# Patient Record
Sex: FEMALE | Race: WHITE | NOT HISPANIC OR LATINO | Employment: UNEMPLOYED | URBAN - METROPOLITAN AREA
[De-identification: names, ages, dates, MRNs, and addresses within clinical notes are randomized per-mention and may not be internally consistent; named-entity substitution may affect disease eponyms.]

---

## 2019-03-13 ENCOUNTER — APPOINTMENT (OUTPATIENT)
Dept: CT IMAGING | Facility: HOSPITAL | Age: 34
End: 2019-03-13

## 2019-03-13 ENCOUNTER — HOSPITAL ENCOUNTER (EMERGENCY)
Facility: HOSPITAL | Age: 34
Discharge: HOME | End: 2019-03-13
Attending: EMERGENCY MEDICINE | Admitting: EMERGENCY MEDICINE

## 2019-03-13 VITALS
HEART RATE: 89 BPM | TEMPERATURE: 98.8 F | OXYGEN SATURATION: 99 % | HEIGHT: 66 IN | SYSTOLIC BLOOD PRESSURE: 121 MMHG | RESPIRATION RATE: 14 BRPM | DIASTOLIC BLOOD PRESSURE: 87 MMHG | BODY MASS INDEX: 33.89 KG/M2 | WEIGHT: 210.9 LBS

## 2019-03-13 DIAGNOSIS — N20.0 KIDNEY STONE: Primary | ICD-10-CM

## 2019-03-13 LAB
ALBUMIN SERPL-MCNC: 4.2 G/DL (ref 3.5–5.2)
ALBUMIN/GLOB SERPL: 1.5 G/DL
ALP SERPL-CCNC: 85 U/L (ref 39–117)
ALT SERPL W P-5'-P-CCNC: 21 U/L (ref 1–33)
ANION GAP SERPL CALCULATED.3IONS-SCNC: 12.1 MMOL/L
AST SERPL-CCNC: 16 U/L (ref 1–32)
BACTERIA UR QL AUTO: ABNORMAL /HPF
BASOPHILS # BLD AUTO: 0.03 10*3/MM3 (ref 0–0.2)
BASOPHILS NFR BLD AUTO: 0.3 % (ref 0–1.5)
BILIRUB SERPL-MCNC: 0.8 MG/DL (ref 0.1–1.2)
BILIRUB UR QL STRIP: NEGATIVE
BUN BLD-MCNC: 16 MG/DL (ref 6–20)
BUN/CREAT SERPL: 25.4 (ref 7–25)
CALCIUM SPEC-SCNC: 9 MG/DL (ref 8.6–10.5)
CHLORIDE SERPL-SCNC: 106 MMOL/L (ref 98–107)
CLARITY UR: ABNORMAL
CO2 SERPL-SCNC: 22.9 MMOL/L (ref 22–29)
COLOR UR: YELLOW
CREAT BLD-MCNC: 0.63 MG/DL (ref 0.57–1)
DEPRECATED RDW RBC AUTO: 42.8 FL (ref 37–54)
EOSINOPHIL # BLD AUTO: 0.24 10*3/MM3 (ref 0–0.4)
EOSINOPHIL NFR BLD AUTO: 2.6 % (ref 0.3–6.2)
ERYTHROCYTE [DISTWIDTH] IN BLOOD BY AUTOMATED COUNT: 13 % (ref 12.3–15.4)
GFR SERPL CREATININE-BSD FRML MDRD: 108 ML/MIN/1.73
GLOBULIN UR ELPH-MCNC: 2.8 GM/DL
GLUCOSE BLD-MCNC: 122 MG/DL (ref 65–99)
GLUCOSE UR STRIP-MCNC: NEGATIVE MG/DL
HCG SERPL QL: NEGATIVE
HCT VFR BLD AUTO: 46 % (ref 34–46.6)
HGB BLD-MCNC: 15 G/DL (ref 12–15.9)
HGB UR QL STRIP.AUTO: ABNORMAL
HYALINE CASTS UR QL AUTO: ABNORMAL /LPF
IMM GRANULOCYTES # BLD AUTO: 0.03 10*3/MM3 (ref 0–0.05)
IMM GRANULOCYTES NFR BLD AUTO: 0.3 % (ref 0–0.5)
KETONES UR QL STRIP: NEGATIVE
LEUKOCYTE ESTERASE UR QL STRIP.AUTO: NEGATIVE
LYMPHOCYTES # BLD AUTO: 1.11 10*3/MM3 (ref 0.7–3.1)
LYMPHOCYTES NFR BLD AUTO: 12.2 % (ref 19.6–45.3)
MCH RBC QN AUTO: 29.8 PG (ref 26.6–33)
MCHC RBC AUTO-ENTMCNC: 32.6 G/DL (ref 31.5–35.7)
MCV RBC AUTO: 91.3 FL (ref 79–97)
MONOCYTES # BLD AUTO: 0.6 10*3/MM3 (ref 0.1–0.9)
MONOCYTES NFR BLD AUTO: 6.6 % (ref 5–12)
NEUTROPHILS # BLD AUTO: 7.11 10*3/MM3 (ref 1.4–7)
NEUTROPHILS NFR BLD AUTO: 78 % (ref 42.7–76)
NITRITE UR QL STRIP: NEGATIVE
NRBC BLD AUTO-RTO: 0 /100 WBC (ref 0–0)
PH UR STRIP.AUTO: 5.5 [PH] (ref 5–8)
PLATELET # BLD AUTO: 350 10*3/MM3 (ref 140–450)
PMV BLD AUTO: 10.1 FL (ref 6–12)
POTASSIUM BLD-SCNC: 4.5 MMOL/L (ref 3.5–5.2)
PROT SERPL-MCNC: 7 G/DL (ref 6–8.5)
PROT UR QL STRIP: NEGATIVE
RBC # BLD AUTO: 5.04 10*6/MM3 (ref 3.77–5.28)
RBC # UR: ABNORMAL /HPF
REF LAB TEST METHOD: ABNORMAL
SODIUM BLD-SCNC: 141 MMOL/L (ref 136–145)
SP GR UR STRIP: 1.02 (ref 1–1.03)
SQUAMOUS #/AREA URNS HPF: ABNORMAL /HPF
UROBILINOGEN UR QL STRIP: ABNORMAL
WBC NRBC COR # BLD: 9.12 10*3/MM3 (ref 3.4–10.8)
WBC UR QL AUTO: ABNORMAL /HPF

## 2019-03-13 PROCEDURE — 96374 THER/PROPH/DIAG INJ IV PUSH: CPT

## 2019-03-13 PROCEDURE — 25010000002 KETOROLAC TROMETHAMINE PER 15 MG: Performed by: NURSE PRACTITIONER

## 2019-03-13 PROCEDURE — 99284 EMERGENCY DEPT VISIT MOD MDM: CPT

## 2019-03-13 PROCEDURE — 81001 URINALYSIS AUTO W/SCOPE: CPT | Performed by: PHYSICIAN ASSISTANT

## 2019-03-13 PROCEDURE — 85025 COMPLETE CBC W/AUTO DIFF WBC: CPT | Performed by: PHYSICIAN ASSISTANT

## 2019-03-13 PROCEDURE — 96375 TX/PRO/DX INJ NEW DRUG ADDON: CPT

## 2019-03-13 PROCEDURE — 25010000002 ONDANSETRON PER 1 MG: Performed by: NURSE PRACTITIONER

## 2019-03-13 PROCEDURE — 25010000002 ONDANSETRON PER 1 MG: Performed by: EMERGENCY MEDICINE

## 2019-03-13 PROCEDURE — 25010000002 HYDROMORPHONE PER 4 MG: Performed by: NURSE PRACTITIONER

## 2019-03-13 PROCEDURE — 25010000002 IOPAMIDOL 61 % SOLUTION: Performed by: EMERGENCY MEDICINE

## 2019-03-13 PROCEDURE — 25010000002 HYDROMORPHONE PER 4 MG: Performed by: EMERGENCY MEDICINE

## 2019-03-13 PROCEDURE — 74177 CT ABD & PELVIS W/CONTRAST: CPT

## 2019-03-13 PROCEDURE — 84703 CHORIONIC GONADOTROPIN ASSAY: CPT | Performed by: PHYSICIAN ASSISTANT

## 2019-03-13 PROCEDURE — 80053 COMPREHEN METABOLIC PANEL: CPT | Performed by: PHYSICIAN ASSISTANT

## 2019-03-13 PROCEDURE — 96376 TX/PRO/DX INJ SAME DRUG ADON: CPT

## 2019-03-13 RX ORDER — SODIUM CHLORIDE 0.9 % (FLUSH) 0.9 %
10 SYRINGE (ML) INJECTION AS NEEDED
Status: DISCONTINUED | OUTPATIENT
Start: 2019-03-13 | End: 2019-03-13 | Stop reason: HOSPADM

## 2019-03-13 RX ORDER — CETIRIZINE HYDROCHLORIDE 10 MG/1
10 TABLET ORAL DAILY
COMMUNITY

## 2019-03-13 RX ORDER — BENZONATATE 200 MG/1
200 CAPSULE ORAL 3 TIMES DAILY PRN
COMMUNITY
Start: 2019-03-09 | End: 2019-03-14

## 2019-03-13 RX ORDER — KETOROLAC TROMETHAMINE 30 MG/ML
30 INJECTION, SOLUTION INTRAMUSCULAR; INTRAVENOUS ONCE
Status: COMPLETED | OUTPATIENT
Start: 2019-03-13 | End: 2019-03-13

## 2019-03-13 RX ORDER — ONDANSETRON 2 MG/ML
4 INJECTION INTRAMUSCULAR; INTRAVENOUS ONCE
Status: COMPLETED | OUTPATIENT
Start: 2019-03-13 | End: 2019-03-13

## 2019-03-13 RX ORDER — HYDROMORPHONE HYDROCHLORIDE 1 MG/ML
0.5 INJECTION, SOLUTION INTRAMUSCULAR; INTRAVENOUS; SUBCUTANEOUS ONCE
Status: COMPLETED | OUTPATIENT
Start: 2019-03-13 | End: 2019-03-13

## 2019-03-13 RX ORDER — OXYCODONE HYDROCHLORIDE AND ACETAMINOPHEN 5; 325 MG/1; MG/1
1 TABLET ORAL EVERY 4 HOURS PRN
Qty: 12 TABLET | Refills: 0 | Status: ON HOLD | OUTPATIENT
Start: 2019-03-13 | End: 2019-03-15 | Stop reason: SDUPTHER

## 2019-03-13 RX ORDER — AZITHROMYCIN 250 MG/1
250 TABLET, FILM COATED ORAL DAILY
COMMUNITY
Start: 2019-03-09 | End: 2019-03-14

## 2019-03-13 RX ORDER — ONDANSETRON 4 MG/1
4 TABLET, ORALLY DISINTEGRATING ORAL EVERY 6 HOURS PRN
Qty: 12 TABLET | Refills: 0 | Status: SHIPPED | OUTPATIENT
Start: 2019-03-13 | End: 2022-06-08

## 2019-03-13 RX ADMIN — KETOROLAC TROMETHAMINE 30 MG: 30 INJECTION, SOLUTION INTRAMUSCULAR; INTRAVENOUS at 07:43

## 2019-03-13 RX ADMIN — SODIUM CHLORIDE 1000 ML: 9 INJECTION, SOLUTION INTRAVENOUS at 06:45

## 2019-03-13 RX ADMIN — ONDANSETRON 4 MG: 2 INJECTION INTRAMUSCULAR; INTRAVENOUS at 06:47

## 2019-03-13 RX ADMIN — HYDROMORPHONE HYDROCHLORIDE 0.5 MG: 1 INJECTION, SOLUTION INTRAMUSCULAR; INTRAVENOUS; SUBCUTANEOUS at 05:38

## 2019-03-13 RX ADMIN — HYDROMORPHONE HYDROCHLORIDE 0.5 MG: 1 INJECTION, SOLUTION INTRAMUSCULAR; INTRAVENOUS; SUBCUTANEOUS at 06:48

## 2019-03-13 RX ADMIN — ONDANSETRON 4 MG: 2 INJECTION INTRAMUSCULAR; INTRAVENOUS at 05:38

## 2019-03-13 RX ADMIN — IOPAMIDOL 85 ML: 612 INJECTION, SOLUTION INTRAVENOUS at 07:08

## 2019-03-13 NOTE — DISCHARGE INSTRUCTIONS
Medications as ordered  Strain all urine  Increase water intake  Ibuprofen 800 mg three times a day  Keep scheduled appointment with Dr Bains tomorrow  Return to er for fever, chills, vomiting, decreased urine output, increased pain or any new or worsening symptoms

## 2019-03-13 NOTE — ED PROVIDER NOTES
EMERGENCY DEPARTMENT ENCOUNTER    CHIEF COMPLAINT  Chief Complaint: flank pain  History given by:patient  History limited by:nothing  Time Seen: 0607  Room Number: 31/31  PMD: Maggi Velásquez MD      HPI:  Pt is a 33 y.o. female who presents with left sided flank pain that began last night at 0300. Her pain is waxing and waning and is sometimes sharp. She also had one episode of diarrhea last night when her pain began. Since her pain began, she has also had several episodes of N/V. Pt denies trouble urinating. She has no other complaints at this time.     Duration: 3 hours  Timing: waxing and waning  Location: left flank  Radiation: none  Quality: harp  Intensity/Severity: moderate  Progression: worsening  Associated Symptoms: N/V  Aggravating Factors: none  Alleviating Factors: none  Previous Episodes: none  Treatment before arrival: none    PAST MEDICAL HISTORY  Active Ambulatory Problems     Diagnosis Date Noted   • Pregnancy 07/26/2016   • Pregnant 07/27/2016   • Pyelonephritis affecting pregnancy 07/27/2016   • Pyelonephritis affecting pregnancy in second trimester, antepartum 07/28/2016     Resolved Ambulatory Problems     Diagnosis Date Noted   • No Resolved Ambulatory Problems     Past Medical History:   Diagnosis Date   • Abnormal Pap smear of cervix    • Anxiety    • Depression    • Disease of thyroid gland    • Urinary tract infection        PAST SURGICAL HISTORY  Past Surgical History:   Procedure Laterality Date   • ADENOIDECTOMY     • CERVICAL BIOPSY  W/ LOOP ELECTRODE EXCISION     • EAR TUBES     • TONSILLECTOMY     • WISDOM TOOTH EXTRACTION         FAMILY HISTORY  Family History   Problem Relation Age of Onset   • Hypertension Brother        SOCIAL HISTORY  Social History     Socioeconomic History   • Marital status:      Spouse name: Not on file   • Number of children: Not on file   • Years of education: Not on file   • Highest education level: Not on file   Social Needs   •  Financial resource strain: Not on file   • Food insecurity - worry: Not on file   • Food insecurity - inability: Not on file   • Transportation needs - medical: Not on file   • Transportation needs - non-medical: Not on file   Occupational History   • Not on file   Tobacco Use   • Smoking status: Never Smoker   • Smokeless tobacco: Never Used   Substance and Sexual Activity   • Alcohol use: No   • Drug use: No   • Sexual activity: Defer   Other Topics Concern   • Not on file   Social History Narrative   • Not on file         ALLERGIES  Sulfa antibiotics    REVIEW OF SYSTEMS  Review of Systems   Constitutional: Negative for chills and fever.   HENT: Negative for sore throat.    Respiratory: Negative for shortness of breath.    Cardiovascular: Negative for chest pain.   Gastrointestinal: Positive for nausea and vomiting.   Genitourinary: Positive for flank pain (left). Negative for dysuria.   Musculoskeletal: Negative for back pain.   Skin: Negative for rash.   Neurological: Negative for dizziness.   Psychiatric/Behavioral: The patient is not nervous/anxious.        PHYSICAL EXAM  ED Triage Vitals   Temp Heart Rate Resp BP SpO2   03/13/19 0506 03/13/19 0506 03/13/19 0506 03/13/19 0515 03/13/19 0506   98.8 °F (37.1 °C) 111 16 138/93 95 %       Physical Exam   Constitutional: She is well-developed, well-nourished, and in no distress.   HENT:   Head: Normocephalic.   Mouth/Throat: Mucous membranes are normal.   Eyes: No scleral icterus.   Neck: Normal range of motion.   Cardiovascular: Regular rhythm and normal heart sounds. Tachycardia present.   Pulmonary/Chest: Effort normal and breath sounds normal.   Abdominal: Soft. Bowel sounds are normal. There is tenderness in the left lower quadrant. There is CVA tenderness (left).   Pt actively vomiting on exam   Musculoskeletal: Normal range of motion.   Neurological: She is alert.   Skin: Skin is warm and dry.   Psychiatric: Mood and affect normal.   Nursing note and vitals  reviewed.      LAB RESULTS  Recent Results (from the past 24 hour(s))   Comprehensive Metabolic Panel    Collection Time: 03/13/19  5:24 AM   Result Value Ref Range    Glucose 122 (H) 65 - 99 mg/dL    BUN 16 6 - 20 mg/dL    Creatinine 0.63 0.57 - 1.00 mg/dL    Sodium 141 136 - 145 mmol/L    Potassium 4.5 3.5 - 5.2 mmol/L    Chloride 106 98 - 107 mmol/L    CO2 22.9 22.0 - 29.0 mmol/L    Calcium 9.0 8.6 - 10.5 mg/dL    Total Protein 7.0 6.0 - 8.5 g/dL    Albumin 4.20 3.50 - 5.20 g/dL    ALT (SGPT) 21 1 - 33 U/L    AST (SGOT) 16 1 - 32 U/L    Alkaline Phosphatase 85 39 - 117 U/L    Total Bilirubin 0.8 0.1 - 1.2 mg/dL    eGFR Non African Amer 108 >60 mL/min/1.73    Globulin 2.8 gm/dL    A/G Ratio 1.5 g/dL    BUN/Creatinine Ratio 25.4 (H) 7.0 - 25.0    Anion Gap 12.1 mmol/L   hCG, Serum, Qualitative    Collection Time: 03/13/19  5:24 AM   Result Value Ref Range    HCG Qualitative Negative Negative   Urinalysis With Microscopic If Indicated (No Culture) - Urine, Clean Catch    Collection Time: 03/13/19  5:24 AM   Result Value Ref Range    Color, UA Yellow Yellow, Straw    Appearance, UA Cloudy (A) Clear    pH, UA 5.5 5.0 - 8.0    Specific Gravity, UA 1.022 1.005 - 1.030    Glucose, UA Negative Negative    Ketones, UA Negative Negative    Bilirubin, UA Negative Negative    Blood, UA Moderate (2+) (A) Negative    Protein, UA Negative Negative    Leuk Esterase, UA Negative Negative    Nitrite, UA Negative Negative    Urobilinogen, UA 0.2 E.U./dL 0.2 - 1.0 E.U./dL   CBC Auto Differential    Collection Time: 03/13/19  5:24 AM   Result Value Ref Range    WBC 9.12 3.40 - 10.80 10*3/mm3    RBC 5.04 3.77 - 5.28 10*6/mm3    Hemoglobin 15.0 12.0 - 15.9 g/dL    Hematocrit 46.0 34.0 - 46.6 %    MCV 91.3 79.0 - 97.0 fL    MCH 29.8 26.6 - 33.0 pg    MCHC 32.6 31.5 - 35.7 g/dL    RDW 13.0 12.3 - 15.4 %    RDW-SD 42.8 37.0 - 54.0 fl    MPV 10.1 6.0 - 12.0 fL    Platelets 350 140 - 450 10*3/mm3    Neutrophil % 78.0 (H) 42.7 - 76.0 %     Lymphocyte % 12.2 (L) 19.6 - 45.3 %    Monocyte % 6.6 5.0 - 12.0 %    Eosinophil % 2.6 0.3 - 6.2 %    Basophil % 0.3 0.0 - 1.5 %    Immature Grans % 0.3 0.0 - 0.5 %    Neutrophils, Absolute 7.11 (H) 1.40 - 7.00 10*3/mm3    Lymphocytes, Absolute 1.11 0.70 - 3.10 10*3/mm3    Monocytes, Absolute 0.60 0.10 - 0.90 10*3/mm3    Eosinophils, Absolute 0.24 0.00 - 0.40 10*3/mm3    Basophils, Absolute 0.03 0.00 - 0.20 10*3/mm3    Immature Grans, Absolute 0.03 0.00 - 0.05 10*3/mm3    nRBC 0.0 0.0 - 0.0 /100 WBC   Urinalysis, Microscopic Only - Urine, Clean Catch    Collection Time: 03/13/19  5:24 AM   Result Value Ref Range    RBC, UA 31-50 (A) None Seen, 0-2 /HPF    WBC, UA 0-2 None Seen, 0-2 /HPF    Bacteria, UA 1+ (A) None Seen /HPF    Squamous Epithelial Cells, UA 3-6 (A) None Seen, 0-2 /HPF    Hyaline Casts, UA 3-6 None Seen /LPF    Methodology Automated Microscopy        I ordered the above labs and reviewed the results    RADIOLOGY  CT Abdomen Pelvis With Contrast   Final Result   CONCLUSION:  1. 5 mm distal left UVJ calculus with hydronephrosis and delayed left  nephrogram.  2. Mildly enlarged mesenteric lymph nodes, repeat CT scan in 6 months  for further evaluation.        Reviewed CT abd/pelvis which shows a 5mm stone in the distal left UVJ with mild to moderate hydronephrosis. Independently viewed by me. Interpreted by radiologist. Discussed with Dr. Guardado.    I ordered the above noted radiological studies and reviewed the images on the PACS system.  Spoke with Dr. Guardado regarding CT scan results    PROGRESS AND CONSULTS      0533  Ordered labs and UA for further evaluation. Ordered dilaudid for pain and zofran for nausea.    0618  Ordered CT abd/pelvis for further evaluation. Ordered IV fluids for hydration, dilaudid for pain, and zofran for nausea.    0725  Reviewed Pt's history and workup with Dr. Burks. After bedside evaluation, Dr. Burks agrees with the plan of care.    0731  Ordered toradol for  pain.    0802  Rechecked Pt who is resting comfortably after receiving her toradol. Informed her that her CT abd/pelvis shows a 5mm stone on the left side. I offered admission but Pt would liek to go home if possible. Discussed plans to consult with urology and determine treatment plan. Pt understands and agrees to all plans. All questions answered.     0813  Placed call to urology for consult.    0828  Discussed pt's case with Dr. Bains (urology) who agrees with plan to discharge pt and he is able to see Pt tomorrow in the office.     0854  Rechecked Pt who is resting comfortably. Informed her that I spoke with Dr. Bains (urology). Pt will be discharged and should call him and schedule an apt for tomorrow. She should return to the ER if she develops new or worsening symptoms.    Reviewed implications of results, diagnosis, meds, responsibility to follow up, warning signs and symptoms of possible worsening, potential complications and reasons to return to ER with patient.  Discussed all results and noted any abnormalities with patient.  Discussed absolute need to recheck abnormalities with the urologist and her PCP    Discussed plan for discharge, as there is no emergent indication for admission.  Pt is agreeable and understands need for follow up and repeat testing.  Pt is aware that discharge does not mean that nothing is wrong but it indicates no emergency is present.  Pt is discharged with instructions to follow up with primary care doctor to have their blood pressure rechecked.       DIAGNOSIS  Final diagnoses:   Kidney stone       FOLLOW UP   Maggi Velásquez MD  [address]  [address] [id number] [phone]    Schedule an appointment as soon as possible for a visit       Wilbur Bains MD  [address] [facility] [id number] [phone]    Go in 1 day      Maggi Velásquez MD  [address]  [facility] [address]  [place] [address] [phone]      As  "needed      RX     Medication List      New Prescriptions    ondansetron ODT 4 MG disintegrating tablet  Commonly known as:  ZOFRAN-ODT  Take 1 tablet by mouth Every 6 (Six) Hours As Needed for Nausea or   Vomiting.     oxyCODONE-acetaminophen 5-325 MG per tablet  Commonly known as:  PERCOCET  Take 1 tablet by mouth Every 4 (Four) Hours As Needed for Moderate Pain .       Panchito report [id number] reviewed.  Risks, benefits, alternatives discussed with patient.  Pt consents to treatment and agrees to follow up with PMD tomorrow for further care and any other prescriptions.         COURSE & MEDICAL DECISION MAKING  Pertinent Labs and Imaging studies that were ordered and reviewed are noted above.  Results were reviewed/discussed with the patient and they were also made aware of online assess.   Pt also made aware that some labs, such as cultures, will not be resulted during ER visit and follow up with PMD is necessary.     MEDICATIONS GIVEN IN ER  Medications   sodium chloride 0.9 % flush 10 mL (not administered)   HYDROmorphone (DILAUDID) injection 0.5 mg (0.5 mg Intravenous Given 3/13/19 0538)   ondansetron (ZOFRAN) injection 4 mg (4 mg Intravenous Given 3/13/19 0538)   sodium chloride 0.9 % bolus 1,000 mL (1,000 mL Intravenous New Bag 3/13/19 0645)   HYDROmorphone (DILAUDID) injection 0.5 mg (0.5 mg Intravenous Given 3/13/19 0648)   ondansetron (ZOFRAN) injection 4 mg (4 mg Intravenous Given 3/13/19 0647)   iopamidol (ISOVUE-300) 61 % injection 100 mL (85 mL Intravenous Given by Other 3/13/19 0708)   ketorolac (TORADOL) injection 30 mg (30 mg Intravenous Given 3/13/19 0743)       BP (!) 123/104   Pulse 89   Temp 98.8 °F (37.1 °C) (Tympanic)   Resp 16   Ht 167.6 cm (66\")   Wt 95.7 kg (210 lb 14.4 oz)   LMP  (Within Weeks)   SpO2 96%   BMI 34.04 kg/m²       I personally reviewed the past medical history, past surgical history, social history, family history, current medications and allergies as they appear in " this chart.  The scribe's note accurately reflects the work and decisions made by me.     Documentation assistance provided by mar Francois for GISEL Rojas on 3/13/2019 at 8:55 AM. Information recorded by the scribe was done at my direction and has been verified and validated by me.     Fabienne Francois  03/13/19 0857       Honey Oliva, MARIAELENA  03/13/19 1215

## 2019-03-13 NOTE — ED PROVIDER NOTES
MD ATTESTATION NOTE    The JEANNE and I have discussed this patient's history, physical exam, and treatment plan.  I have reviewed the documentation and personally had a face to face interaction with the patient. I affirm the documentation and agree with the treatment and plan.  The attached note describes my personal findings.      Pt presents to the ED c/o constant left flank pain onset at about 04:00 AM today, which awoke pt abruptly from sleep. Pt has also had N/V/D. Pt denies dysuria, chest pain, and dyspnea. Pt also denies prior h/o nephrolithiasis.     On physical exam, pt appears uncomfortable. There is left CVA tenderness.    Pt's CT Abd shows a 5 mm distal left UVJ stone with hydronephrosis present. Pt has been administered Toradol, Dilaudid, Zofran, and IV fluids in the ER.     Discussed with Dr. Bains who will see patient in the office.          Documentation assistance provided by Deann Kirk. Information recorded by the scribe was done at my direction and has been verified and validated by me.     Entered by Deann Kirk, acting as scribe for Dr. Kimmie MD.           Deann Kirk  03/13/19 6724       Titus Burks MD  03/13/19 1318

## 2019-03-15 ENCOUNTER — ANESTHESIA (OUTPATIENT)
Dept: PERIOP | Facility: HOSPITAL | Age: 34
End: 2019-03-15

## 2019-03-15 ENCOUNTER — APPOINTMENT (OUTPATIENT)
Dept: GENERAL RADIOLOGY | Facility: HOSPITAL | Age: 34
End: 2019-03-15

## 2019-03-15 ENCOUNTER — ANESTHESIA EVENT (OUTPATIENT)
Dept: PERIOP | Facility: HOSPITAL | Age: 34
End: 2019-03-15

## 2019-03-15 ENCOUNTER — HOSPITAL ENCOUNTER (OUTPATIENT)
Facility: HOSPITAL | Age: 34
Setting detail: HOSPITAL OUTPATIENT SURGERY
Discharge: HOME | End: 2019-03-15
Attending: UROLOGY | Admitting: UROLOGY

## 2019-03-15 VITALS
OXYGEN SATURATION: 94 % | TEMPERATURE: 98.8 F | DIASTOLIC BLOOD PRESSURE: 73 MMHG | RESPIRATION RATE: 18 BRPM | SYSTOLIC BLOOD PRESSURE: 110 MMHG | HEART RATE: 82 BPM

## 2019-03-15 DIAGNOSIS — N20.1 URETERAL CALCULUS, LEFT: Primary | ICD-10-CM

## 2019-03-15 DIAGNOSIS — N20.0 KIDNEY STONE ON LEFT SIDE: ICD-10-CM

## 2019-03-15 LAB — HCG SERPL QL: NEGATIVE

## 2019-03-15 PROCEDURE — C1769 GUIDE WIRE: HCPCS | Performed by: UROLOGY

## 2019-03-15 PROCEDURE — 25010000003 CEFAZOLIN SODIUM-DEXTROSE 2-3 GM-%(50ML) RECONSTITUTED SOLUTION: Performed by: UROLOGY

## 2019-03-15 PROCEDURE — 74420 UROGRAPHY RTRGR +-KUB: CPT

## 2019-03-15 PROCEDURE — 25010000002 DEXAMETHASONE SODIUM PHOSPHATE 10 MG/ML SOLUTION: Performed by: NURSE ANESTHETIST, CERTIFIED REGISTERED

## 2019-03-15 PROCEDURE — C2617 STENT, NON-COR, TEM W/O DEL: HCPCS | Performed by: UROLOGY

## 2019-03-15 PROCEDURE — 25010000002 IOPAMIDOL 61 % SOLUTION: Performed by: UROLOGY

## 2019-03-15 PROCEDURE — 25010000002 MIDAZOLAM PER 1 MG: Performed by: NURSE ANESTHETIST, CERTIFIED REGISTERED

## 2019-03-15 PROCEDURE — 25010000002 FENTANYL CITRATE (PF) 100 MCG/2ML SOLUTION: Performed by: NURSE ANESTHETIST, CERTIFIED REGISTERED

## 2019-03-15 PROCEDURE — C1758 CATHETER, URETERAL: HCPCS | Performed by: UROLOGY

## 2019-03-15 PROCEDURE — 84703 CHORIONIC GONADOTROPIN ASSAY: CPT | Performed by: NURSE ANESTHETIST, CERTIFIED REGISTERED

## 2019-03-15 PROCEDURE — 25010000002 ONDANSETRON PER 1 MG: Performed by: NURSE ANESTHETIST, CERTIFIED REGISTERED

## 2019-03-15 PROCEDURE — 82360 CALCULUS ASSAY QUANT: CPT | Performed by: UROLOGY

## 2019-03-15 PROCEDURE — 25010000002 PROPOFOL 10 MG/ML EMULSION: Performed by: NURSE ANESTHETIST, CERTIFIED REGISTERED

## 2019-03-15 DEVICE — URETERAL STENT
Type: IMPLANTABLE DEVICE | Site: URETER | Status: FUNCTIONAL
Brand: CONTOUR™

## 2019-03-15 RX ORDER — SODIUM CHLORIDE, SODIUM LACTATE, POTASSIUM CHLORIDE, CALCIUM CHLORIDE 600; 310; 30; 20 MG/100ML; MG/100ML; MG/100ML; MG/100ML
100 INJECTION, SOLUTION INTRAVENOUS CONTINUOUS
Status: DISCONTINUED | OUTPATIENT
Start: 2019-03-15 | End: 2019-03-15 | Stop reason: HOSPADM

## 2019-03-15 RX ORDER — ONDANSETRON 2 MG/ML
4 INJECTION INTRAMUSCULAR; INTRAVENOUS ONCE AS NEEDED
Status: DISCONTINUED | OUTPATIENT
Start: 2019-03-15 | End: 2019-03-15 | Stop reason: HOSPADM

## 2019-03-15 RX ORDER — OXYCODONE HYDROCHLORIDE AND ACETAMINOPHEN 5; 325 MG/1; MG/1
1 TABLET ORAL ONCE AS NEEDED
Status: DISCONTINUED | OUTPATIENT
Start: 2019-03-15 | End: 2019-03-15 | Stop reason: HOSPADM

## 2019-03-15 RX ORDER — DEXAMETHASONE SODIUM PHOSPHATE 10 MG/ML
8 INJECTION INTRAMUSCULAR; INTRAVENOUS ONCE AS NEEDED
Status: COMPLETED | OUTPATIENT
Start: 2019-03-15 | End: 2019-03-15

## 2019-03-15 RX ORDER — MEPERIDINE HYDROCHLORIDE 25 MG/ML
12.5 INJECTION INTRAMUSCULAR; INTRAVENOUS; SUBCUTANEOUS
Status: DISCONTINUED | OUTPATIENT
Start: 2019-03-15 | End: 2019-03-15 | Stop reason: HOSPADM

## 2019-03-15 RX ORDER — CEFAZOLIN SODIUM 2 G/50ML
2 SOLUTION INTRAVENOUS ONCE
Status: COMPLETED | OUTPATIENT
Start: 2019-03-15 | End: 2019-03-15

## 2019-03-15 RX ORDER — SODIUM CHLORIDE 9 MG/ML
40 INJECTION, SOLUTION INTRAVENOUS AS NEEDED
Status: DISCONTINUED | OUTPATIENT
Start: 2019-03-15 | End: 2019-03-15 | Stop reason: HOSPADM

## 2019-03-15 RX ORDER — MIDAZOLAM HYDROCHLORIDE 1 MG/ML
2 INJECTION INTRAMUSCULAR; INTRAVENOUS
Status: DISCONTINUED | OUTPATIENT
Start: 2019-03-15 | End: 2019-03-15 | Stop reason: HOSPADM

## 2019-03-15 RX ORDER — ONDANSETRON 2 MG/ML
4 INJECTION INTRAMUSCULAR; INTRAVENOUS ONCE AS NEEDED
Status: COMPLETED | OUTPATIENT
Start: 2019-03-15 | End: 2019-03-15

## 2019-03-15 RX ORDER — SODIUM CHLORIDE 0.9 % (FLUSH) 0.9 %
3 SYRINGE (ML) INJECTION EVERY 12 HOURS SCHEDULED
Status: DISCONTINUED | OUTPATIENT
Start: 2019-03-15 | End: 2019-03-15 | Stop reason: HOSPADM

## 2019-03-15 RX ORDER — PHENAZOPYRIDINE HYDROCHLORIDE 200 MG/1
200 TABLET, FILM COATED ORAL 3 TIMES DAILY PRN
Qty: 20 TABLET | Refills: 0 | Status: SHIPPED | OUTPATIENT
Start: 2019-03-15 | End: 2022-06-08

## 2019-03-15 RX ORDER — MIDAZOLAM HYDROCHLORIDE 1 MG/ML
1 INJECTION INTRAMUSCULAR; INTRAVENOUS
Status: DISCONTINUED | OUTPATIENT
Start: 2019-03-15 | End: 2019-03-15 | Stop reason: HOSPADM

## 2019-03-15 RX ORDER — LIDOCAINE HYDROCHLORIDE 10 MG/ML
0.5 INJECTION, SOLUTION EPIDURAL; INFILTRATION; INTRACAUDAL; PERINEURAL ONCE AS NEEDED
Status: DISCONTINUED | OUTPATIENT
Start: 2019-03-15 | End: 2019-03-15 | Stop reason: HOSPADM

## 2019-03-15 RX ORDER — MAGNESIUM HYDROXIDE 1200 MG/15ML
LIQUID ORAL AS NEEDED
Status: DISCONTINUED | OUTPATIENT
Start: 2019-03-15 | End: 2019-03-15 | Stop reason: HOSPADM

## 2019-03-15 RX ORDER — SODIUM CHLORIDE 0.9 % (FLUSH) 0.9 %
1-10 SYRINGE (ML) INJECTION AS NEEDED
Status: DISCONTINUED | OUTPATIENT
Start: 2019-03-15 | End: 2019-03-15 | Stop reason: HOSPADM

## 2019-03-15 RX ORDER — SODIUM CHLORIDE, SODIUM LACTATE, POTASSIUM CHLORIDE, CALCIUM CHLORIDE 600; 310; 30; 20 MG/100ML; MG/100ML; MG/100ML; MG/100ML
9 INJECTION, SOLUTION INTRAVENOUS CONTINUOUS
Status: DISCONTINUED | OUTPATIENT
Start: 2019-03-15 | End: 2019-03-15

## 2019-03-15 RX ORDER — PHENAZOPYRIDINE HYDROCHLORIDE 100 MG/1
200 TABLET, FILM COATED ORAL ONCE
Status: COMPLETED | OUTPATIENT
Start: 2019-03-15 | End: 2019-03-15

## 2019-03-15 RX ORDER — LIDOCAINE HYDROCHLORIDE 20 MG/ML
INJECTION, SOLUTION INFILTRATION; PERINEURAL AS NEEDED
Status: DISCONTINUED | OUTPATIENT
Start: 2019-03-15 | End: 2019-03-15 | Stop reason: SURG

## 2019-03-15 RX ORDER — IBUPROFEN 800 MG/1
800 TABLET ORAL EVERY 6 HOURS PRN
COMMUNITY
End: 2022-06-08

## 2019-03-15 RX ORDER — FENTANYL CITRATE 50 UG/ML
INJECTION, SOLUTION INTRAMUSCULAR; INTRAVENOUS AS NEEDED
Status: DISCONTINUED | OUTPATIENT
Start: 2019-03-15 | End: 2019-03-15 | Stop reason: SURG

## 2019-03-15 RX ORDER — LEVOFLOXACIN 500 MG/1
500 TABLET, FILM COATED ORAL DAILY
Qty: 7 TABLET | Refills: 0 | Status: SHIPPED | OUTPATIENT
Start: 2019-03-15 | End: 2019-03-22

## 2019-03-15 RX ORDER — OXYCODONE HYDROCHLORIDE AND ACETAMINOPHEN 5; 325 MG/1; MG/1
1 TABLET ORAL EVERY 4 HOURS PRN
Qty: 30 TABLET | Refills: 0 | Status: ON HOLD | OUTPATIENT
Start: 2019-03-15 | End: 2022-06-08 | Stop reason: SDUPTHER

## 2019-03-15 RX ORDER — SODIUM CHLORIDE, SODIUM LACTATE, POTASSIUM CHLORIDE, CALCIUM CHLORIDE 600; 310; 30; 20 MG/100ML; MG/100ML; MG/100ML; MG/100ML
9 INJECTION, SOLUTION INTRAVENOUS CONTINUOUS
Status: DISCONTINUED | OUTPATIENT
Start: 2019-03-15 | End: 2019-03-15 | Stop reason: HOSPADM

## 2019-03-15 RX ORDER — PROPOFOL 10 MG/ML
VIAL (ML) INTRAVENOUS AS NEEDED
Status: DISCONTINUED | OUTPATIENT
Start: 2019-03-15 | End: 2019-03-15 | Stop reason: SURG

## 2019-03-15 RX ADMIN — DEXAMETHASONE SODIUM PHOSPHATE 8 MG: 10 INJECTION INTRAMUSCULAR; INTRAVENOUS at 13:17

## 2019-03-15 RX ADMIN — ONDANSETRON 4 MG: 2 INJECTION, SOLUTION INTRAMUSCULAR; INTRAVENOUS at 13:17

## 2019-03-15 RX ADMIN — MIDAZOLAM HYDROCHLORIDE 1 MG: 1 INJECTION, SOLUTION INTRAMUSCULAR; INTRAVENOUS at 13:51

## 2019-03-15 RX ADMIN — MIDAZOLAM HYDROCHLORIDE 1 MG: 1 INJECTION, SOLUTION INTRAMUSCULAR; INTRAVENOUS at 13:52

## 2019-03-15 RX ADMIN — FAMOTIDINE 20 MG: 10 INJECTION, SOLUTION INTRAVENOUS at 13:17

## 2019-03-15 RX ADMIN — CEFAZOLIN SODIUM 2 G: 2 SOLUTION INTRAVENOUS at 13:58

## 2019-03-15 RX ADMIN — FENTANYL CITRATE 50 MCG: 50 INJECTION, SOLUTION INTRAMUSCULAR; INTRAVENOUS at 14:11

## 2019-03-15 RX ADMIN — PROPOFOL 200 MG: 10 INJECTION, EMULSION INTRAVENOUS at 14:00

## 2019-03-15 RX ADMIN — PHENAZOPYRIDINE HYDROCHLORIDE 200 MG: 100 TABLET ORAL at 15:00

## 2019-03-15 RX ADMIN — OXYCODONE HYDROCHLORIDE AND ACETAMINOPHEN 1 TABLET: 5; 325 TABLET ORAL at 15:38

## 2019-03-15 RX ADMIN — LIDOCAINE HYDROCHLORIDE 100 MG: 20 INJECTION, SOLUTION INFILTRATION; PERINEURAL at 13:59

## 2019-03-15 RX ADMIN — FENTANYL CITRATE 25 MCG: 50 INJECTION, SOLUTION INTRAMUSCULAR; INTRAVENOUS at 14:05

## 2019-03-15 RX ADMIN — SODIUM CHLORIDE, POTASSIUM CHLORIDE, SODIUM LACTATE AND CALCIUM CHLORIDE 9 ML/HR: 600; 310; 30; 20 INJECTION, SOLUTION INTRAVENOUS at 12:07

## 2019-03-15 RX ADMIN — FENTANYL CITRATE 25 MCG: 50 INJECTION, SOLUTION INTRAMUSCULAR; INTRAVENOUS at 13:58

## 2019-03-15 NOTE — OP NOTE
CYSTOSCOPY URETEROSCOPY LASER LITHOTRIPSY  Procedure Note    Anitra Nichols  3/15/2019    Pre-op Diagnosis:   Left Ureteral Stone    Post-op Diagnosis:     Post-Op Diagnosis Codes:     * Ureteral calculus, left [N20.1]    Procedure(s):  CYSTOTCOPY bilateral retrogradesLASER LITHOTRIPSY, STONE BASKET EXTRACTION AND STENT PLACEMENT    Surgeon(s):  Wilbur Bains MD    Anesthesia: General    Staff:   Circulator: Joy Frye RN  Scrub Person: Kelly Min    Estimated Blood Loss: none    Specimens:                  Order Name Source Comment Collection Info Order Time   STONE ANALYSIS Kidney, Left  Collected By: Wilbur Bains MD 3/15/2019  2:31 PM   HCG, SERUM, QUALITATIVE   Collected By: Sheridan Manzo RN 3/15/2019 11:54 AM         Drains: 6 Dominican by 24 cm double-J stent with tether    Findings: Left distal ureteral calculus    Complications: None    Indications: Very pleasant 33-year-old female with intractable pain due to left distal calculus    Procedure: She was taken to the operating given general anesthesia.  She was placed in lithotomy.  She was prepped and draped in sterile fashion.  Panendoscopy was performed.  Bladder was unremarkable.  No evidence of urothelial malignancy was noted.  The trigone was normal.  Bilateral retrograde pyelograms were performed.    The right retrograde pyelogram showed a normal caliber ureter normal pelvis with no filling defects.    The left retrograde pyelogram showed a filling defect in the left distal ureter consistent with a calculus and moderate hydronephrosis.  A guidewire was passed up to the left collecting system.  The rigid ureteroscope was inserted the orifice was cannulated and we passed this up to the level of the stone.  The stone had a shard-like appearance almost like it was a fragment of another stone.  A 400 µm holmium fiber was passed and the stone was fragmented at a power level of 1.2 at 10 pulses per second.  The fragments  were removed with a basket.  The cystoscope was replaced over the guidewire and a 6 Japanese by 24 cm double-J stent was then placed with a tether which was trimmed and tucked into the vagina.  Intermittent and limited fluoroscopic visualization was used.  She was awoken and taken to recovery in stable condition.      Wilbur Bains MD     Date: 3/15/2019  Time: 2:45 PM

## 2019-03-15 NOTE — ANESTHESIA POSTPROCEDURE EVALUATION
Patient: Anitra Nichols    Procedure Summary     Date:  03/15/19 Room / Location:   [facility] OR 4 /  [facility]    Anesthesia Start:  1355 Anesthesia Stop:  1445    Procedure:  CYSTOTCOPY bilateral retrogradesLASER LITHOTRIPSY, STONE BASKET EXTRACTION AND STENT PLACEMENT (Left Ureter) Diagnosis:       Ureteral calculus, left      (Left Ureteral Stone)    Surgeon:  Wilbur Bains MD Provider:  Wilbur Hi CRNA    Anesthesia Type:  general ASA Status:  2          Anesthesia Type: general  Last vitals  BP   110/73 (03/15/19 1547)   Temp   98.8 °F (37.1 °C) (03/15/19 1507)   Pulse   82 (03/15/19 1547)   Resp   18 (03/15/19 1547)     SpO2   94 % (03/15/19 1547)     Post Anesthesia Care and Evaluation    Patient location during evaluation: PHASE II  Patient participation: complete - patient participated  Level of consciousness: awake and alert  Pain score: 0  Pain management: adequate  Airway patency: patent  Anesthetic complications: No anesthetic complications    Cardiovascular status: acceptable  Respiratory status: acceptable  Hydration status: acceptable

## 2019-03-15 NOTE — H&P
[facility] History and Physical    Patient Care Team:  Maggi Velásquez MD as PCP - General (Internal Medicine)    Chief complaint left flank pain    Subjective     Patient is a 33 y.o. female presents with left ureterolithiasis measuring 5mm. Onset of symptoms was around  abrupt starting several days ago.   Associated symptoms include urinary frequency, flank pain on left and nausea.    Review of Systems   Pertinent items are noted in HPI    Past Medical History:   Diagnosis Date   • Abnormal Pap smear of cervix     normal follow up   • Anxiety    • Depression    • Disease of thyroid gland    • Urinary tract infection      Past Surgical History:   Procedure Laterality Date   • ADENOIDECTOMY     • CERVICAL BIOPSY  W/ LOOP ELECTRODE EXCISION     • EAR TUBES     • TONSILLECTOMY     • WISDOM TOOTH EXTRACTION       Family History   Problem Relation Age of Onset   • Hypertension Brother      Social History     Tobacco Use   • Smoking status: Former Smoker   • Smokeless tobacco: Never Used   • Tobacco comment: quit 3 yrs ago   Substance Use Topics   • Alcohol use: No     Comment: rare   • Drug use: No     Medications Prior to Admission   Medication Sig Dispense Refill Last Dose   • cetirizine (zyrTEC) 10 MG tablet Take 10 mg by mouth Daily.   3/14/2019 at 0800   • ibuprofen (ADVIL,MOTRIN) 800 MG tablet Take 800 mg by mouth Every 6 (Six) Hours As Needed for Mild Pain .   3/15/2019 at 0930   • levothyroxine (SYNTHROID, LEVOTHROID) 75 MCG tablet Take 75 mcg by mouth daily.   3/14/2019 at 2300   • ondansetron ODT (ZOFRAN-ODT) 4 MG disintegrating tablet Take 1 tablet by mouth Every 6 (Six) Hours As Needed for Nausea or Vomiting. 12 tablet 0 3/15/2019 at 0930   • oxyCODONE-acetaminophen (PERCOCET) 5-325 MG per tablet Take 1 tablet by mouth Every 4 (Four) Hours As Needed for Moderate Pain . 12 tablet 0 Past Month at Unknown time   • Prenatal Vit-Fe Fumarate-FA (PRENATAL, CLASSIC, VITAMIN) 28-0.8 MG tablet  tablet Take 1 tablet by mouth daily.   Past Month at Unknown time   • sertraline (ZOLOFT) 100 MG tablet Take 100 mg by mouth daily.   3/15/2019 at 0930     Allergies:  Sulfa antibiotics    Objective     Vital Signs  Heart Rate:  [87] 87  Resp:  [18] 18  BP: (130)/(96) 130/96  No intake or output data in the 24 hours ending 03/15/19 1349       Physical Exam:      General Appearance:    Alert, cooperative, in no acute distress   Head:    Normocephalic, without obvious abnormality, atraumatic   Eyes:            Lids and lashes normal, conjunctivae and sclerae normal, no   icterus, no pallor, corneas clear, PERRLA   Ears:    Ears appear intact with no abnormalities noted   Throat:   No oral lesions, no thrush, oral mucosa moist   Neck:   No adenopathy, supple, trachea midline, no thyromegaly, no     carotid bruit, no JVD   Back:     No kyphosis present, no scoliosis present, no skin lesions,       erythema or scars, no tenderness to percussion or                   palpation,   range of motion normal   Lungs:     Clear to auscultation,respirations regular, even and                   unlabored    Heart:    Regular rhythm and normal rate, normal S1 and S2, no            murmur, no gallop, no rub, no click   Breast Exam:    Deferred   Abdomen:     Normal bowel sounds, no masses, no organomegaly, soft        non-tender, non-distended, no guarding, no rebound                 tenderness   Genitalia:    Deferred   Extremities:   Moves all extremities well, no edema, no cyanosis, no              redness   Pulses:   Pulses palpable and equal bilaterally   Skin:   No bleeding, bruising or rash   Lymph nodes:   No palpable adenopathy   Neurologic:   Cranial nerves 2 - 12 grossly intact, sensation intact, DTR        present and equal bilaterally     Results Review:    I reviewed the patient's new clinical results.  Results for orders placed or performed during the hospital encounter of 03/15/19   hCG, Serum, Qualitative   Result  Value Ref Range    HCG Qualitative Negative Negative       Assessment:  Assessment/Plan       * No active hospital problems. *      Left Distal Stone    Plan:    Left Ureteroscopy laserlitho stent    I discussed the patients findings and my recommendations with patient. Risks/Complications/Benefits discussed.     Wilbur Bains MD  03/15/19  1:49 PM

## 2019-03-15 NOTE — ANESTHESIA PREPROCEDURE EVALUATION
Anesthesia Evaluation     Patient summary reviewed   no history of anesthetic complications:  NPO Solid Status: > 8 hours  NPO Liquid Status: > 8 hours           Airway   Mallampati: I  TM distance: >3 FB  Neck ROM: full  no difficulty expected  Dental - normal exam     Pulmonary - normal exam    breath sounds clear to auscultation  (+) recent URI (nasal congestion, finished Zpack wed, lungs clear no expectorant) resolved,   Cardiovascular - negative cardio ROS  Exercise tolerance: good (4-7 METS)    Rhythm: regular  Rate: normal        Neuro/Psych  GI/Hepatic/Renal/Endo    (+) obesity,   hypothyroidism (stable),     Musculoskeletal (-) negative ROS    Abdominal  - normal exam   Substance History - negative use     OB/GYN          Other - negative ROS                       Anesthesia Plan    ASA 2     general     Anesthetic plan, all risks, benefits, and alternatives have been provided, discussed and informed consent has been obtained with: patient.  Use of blood products discussed with patient  Consented to blood products.

## 2019-03-15 NOTE — ANESTHESIA PROCEDURE NOTES
Airway  Urgency: elective    Date/Time: 3/15/2019 2:01 PM  Airway not difficult    General Information and Staff    Patient location during procedure: OR    Indications and Patient Condition  Indications for airway management: airway protection    Preoxygenated: yes  MILS maintained throughout  Mask difficulty assessment: 1 - vent by mask    Final Airway Details  Final airway type: supraglottic airway      Successful airway: unique  Size 4    Number of attempts at approach: 1

## 2019-03-16 ENCOUNTER — HOSPITAL ENCOUNTER (EMERGENCY)
Facility: HOSPITAL | Age: 34
Discharge: HOME | End: 2019-03-16
Attending: EMERGENCY MEDICINE | Admitting: EMERGENCY MEDICINE

## 2019-03-16 VITALS
HEART RATE: 65 BPM | RESPIRATION RATE: 16 BRPM | OXYGEN SATURATION: 97 % | BODY MASS INDEX: 45.13 KG/M2 | SYSTOLIC BLOOD PRESSURE: 133 MMHG | HEIGHT: 55 IN | TEMPERATURE: 98.4 F | WEIGHT: 195 LBS | DIASTOLIC BLOOD PRESSURE: 77 MMHG

## 2019-03-16 DIAGNOSIS — N23 RENAL COLIC ON LEFT SIDE: ICD-10-CM

## 2019-03-16 DIAGNOSIS — R11.2 NON-INTRACTABLE VOMITING WITH NAUSEA, UNSPECIFIED VOMITING TYPE: Primary | ICD-10-CM

## 2019-03-16 LAB
ANION GAP SERPL CALCULATED.3IONS-SCNC: 14.9 MMOL/L
BACTERIA UR QL AUTO: ABNORMAL /HPF
BASOPHILS # BLD AUTO: 0.04 10*3/MM3 (ref 0–0.2)
BASOPHILS NFR BLD AUTO: 0.4 % (ref 0–1.5)
BILIRUB UR QL STRIP: NEGATIVE
BUN BLD-MCNC: 7 MG/DL (ref 6–20)
BUN/CREAT SERPL: 9.5 (ref 7–25)
CALCIUM SPEC-SCNC: 8.8 MG/DL (ref 8.6–10.5)
CHLORIDE SERPL-SCNC: 101 MMOL/L (ref 98–107)
CLARITY UR: ABNORMAL
CO2 SERPL-SCNC: 26.1 MMOL/L (ref 22–29)
COLOR UR: ABNORMAL
CREAT BLD-MCNC: 0.74 MG/DL (ref 0.57–1)
DEPRECATED RDW RBC AUTO: 42.5 FL (ref 37–54)
EOSINOPHIL # BLD AUTO: 0.11 10*3/MM3 (ref 0–0.4)
EOSINOPHIL NFR BLD AUTO: 1.1 % (ref 0.3–6.2)
ERYTHROCYTE [DISTWIDTH] IN BLOOD BY AUTOMATED COUNT: 12.9 % (ref 12.3–15.4)
GFR SERPL CREATININE-BSD FRML MDRD: 89 ML/MIN/1.73
GLUCOSE BLD-MCNC: 108 MG/DL (ref 65–99)
GLUCOSE UR STRIP-MCNC: NEGATIVE MG/DL
HCT VFR BLD AUTO: 40.3 % (ref 34–46.6)
HGB BLD-MCNC: 13.1 G/DL (ref 12–15.9)
HGB UR QL STRIP.AUTO: ABNORMAL
HYALINE CASTS UR QL AUTO: ABNORMAL /LPF
IMM GRANULOCYTES # BLD AUTO: 0.02 10*3/MM3 (ref 0–0.05)
IMM GRANULOCYTES NFR BLD AUTO: 0.2 % (ref 0–0.5)
KETONES UR QL STRIP: NEGATIVE
LEUKOCYTE ESTERASE UR QL STRIP.AUTO: ABNORMAL
LYMPHOCYTES # BLD AUTO: 2.82 10*3/MM3 (ref 0.7–3.1)
LYMPHOCYTES NFR BLD AUTO: 27.6 % (ref 19.6–45.3)
MCH RBC QN AUTO: 29.4 PG (ref 26.6–33)
MCHC RBC AUTO-ENTMCNC: 32.5 G/DL (ref 31.5–35.7)
MCV RBC AUTO: 90.4 FL (ref 79–97)
MONOCYTES # BLD AUTO: 1.1 10*3/MM3 (ref 0.1–0.9)
MONOCYTES NFR BLD AUTO: 10.8 % (ref 5–12)
NEUTROPHILS # BLD AUTO: 6.12 10*3/MM3 (ref 1.4–7)
NEUTROPHILS NFR BLD AUTO: 59.9 % (ref 42.7–76)
NITRITE UR QL STRIP: POSITIVE
NRBC BLD AUTO-RTO: 0 /100 WBC (ref 0–0)
PH UR STRIP.AUTO: 7 [PH] (ref 5–8)
PLATELET # BLD AUTO: 309 10*3/MM3 (ref 140–450)
PMV BLD AUTO: 10.1 FL (ref 6–12)
POTASSIUM BLD-SCNC: 3.2 MMOL/L (ref 3.5–5.2)
PROT UR QL STRIP: ABNORMAL
RBC # BLD AUTO: 4.46 10*6/MM3 (ref 3.77–5.28)
RBC # UR: ABNORMAL /HPF
REF LAB TEST METHOD: ABNORMAL
SODIUM BLD-SCNC: 142 MMOL/L (ref 136–145)
SP GR UR STRIP: 1.01 (ref 1–1.03)
SQUAMOUS #/AREA URNS HPF: ABNORMAL /HPF
UROBILINOGEN UR QL STRIP: ABNORMAL
WBC NRBC COR # BLD: 10.21 10*3/MM3 (ref 3.4–10.8)
WBC UR QL AUTO: ABNORMAL /HPF

## 2019-03-16 PROCEDURE — 96374 THER/PROPH/DIAG INJ IV PUSH: CPT

## 2019-03-16 PROCEDURE — 96361 HYDRATE IV INFUSION ADD-ON: CPT

## 2019-03-16 PROCEDURE — 25010000002 HYDROMORPHONE PER 4 MG: Performed by: EMERGENCY MEDICINE

## 2019-03-16 PROCEDURE — 85025 COMPLETE CBC W/AUTO DIFF WBC: CPT | Performed by: EMERGENCY MEDICINE

## 2019-03-16 PROCEDURE — 96375 TX/PRO/DX INJ NEW DRUG ADDON: CPT

## 2019-03-16 PROCEDURE — 25010000002 ONDANSETRON PER 1 MG: Performed by: EMERGENCY MEDICINE

## 2019-03-16 PROCEDURE — 99283 EMERGENCY DEPT VISIT LOW MDM: CPT

## 2019-03-16 PROCEDURE — 80048 BASIC METABOLIC PNL TOTAL CA: CPT | Performed by: EMERGENCY MEDICINE

## 2019-03-16 PROCEDURE — 25010000002 PROMETHAZINE PER 50 MG: Performed by: EMERGENCY MEDICINE

## 2019-03-16 PROCEDURE — 25010000002 KETOROLAC TROMETHAMINE PER 15 MG: Performed by: EMERGENCY MEDICINE

## 2019-03-16 PROCEDURE — 81001 URINALYSIS AUTO W/SCOPE: CPT | Performed by: EMERGENCY MEDICINE

## 2019-03-16 RX ORDER — PROMETHAZINE HYDROCHLORIDE 25 MG/ML
6.25 INJECTION, SOLUTION INTRAMUSCULAR; INTRAVENOUS ONCE
Status: COMPLETED | OUTPATIENT
Start: 2019-03-16 | End: 2019-03-16

## 2019-03-16 RX ORDER — ONDANSETRON 2 MG/ML
4 INJECTION INTRAMUSCULAR; INTRAVENOUS ONCE
Status: COMPLETED | OUTPATIENT
Start: 2019-03-16 | End: 2019-03-16

## 2019-03-16 RX ORDER — SODIUM CHLORIDE 0.9 % (FLUSH) 0.9 %
10 SYRINGE (ML) INJECTION AS NEEDED
Status: DISCONTINUED | OUTPATIENT
Start: 2019-03-16 | End: 2019-03-16 | Stop reason: HOSPADM

## 2019-03-16 RX ORDER — HYDROMORPHONE HYDROCHLORIDE 1 MG/ML
0.5 INJECTION, SOLUTION INTRAMUSCULAR; INTRAVENOUS; SUBCUTANEOUS ONCE
Status: COMPLETED | OUTPATIENT
Start: 2019-03-16 | End: 2019-03-16

## 2019-03-16 RX ORDER — KETOROLAC TROMETHAMINE 30 MG/ML
10 INJECTION, SOLUTION INTRAMUSCULAR; INTRAVENOUS ONCE
Status: COMPLETED | OUTPATIENT
Start: 2019-03-16 | End: 2019-03-16

## 2019-03-16 RX ADMIN — PROMETHAZINE HYDROCHLORIDE 6.25 MG: 25 INJECTION INTRAMUSCULAR; INTRAVENOUS at 19:35

## 2019-03-16 RX ADMIN — ONDANSETRON HYDROCHLORIDE 4 MG: 2 SOLUTION INTRAMUSCULAR; INTRAVENOUS at 18:50

## 2019-03-16 RX ADMIN — HYDROMORPHONE HYDROCHLORIDE 0.5 MG: 1 INJECTION, SOLUTION INTRAMUSCULAR; INTRAVENOUS; SUBCUTANEOUS at 18:50

## 2019-03-16 RX ADMIN — KETOROLAC TROMETHAMINE 10 MG: 30 INJECTION, SOLUTION INTRAMUSCULAR at 18:49

## 2019-03-16 RX ADMIN — SODIUM CHLORIDE, POTASSIUM CHLORIDE, SODIUM LACTATE AND CALCIUM CHLORIDE 1000 ML: 600; 310; 30; 20 INJECTION, SOLUTION INTRAVENOUS at 18:49

## 2019-03-16 NOTE — ED TRIAGE NOTES
PT removed Stent today per dr's orders. Stent was placed yesterday following lithotripsy for stone.  Pt directed to come to er if pain not tolerable at home.

## 2019-03-16 NOTE — ED PROVIDER NOTES
EMERGENCY DEPARTMENT ENCOUNTER    CHIEF COMPLAINT  Chief Complaint: Flank pain  History given by: Pt  History limited by: None  Room Number: 41/41  PMD: Maggi Velásquez MD  Urology Dr. Bains    HPI:  Pt is a 33 y.o. female who presents complaining of worsening, constant left flank pain since removing her urinary stent at 4:30PM. Yesterday pt underwent cytoscopy, laser lithotripsy, stone basket extraction, and stent placement for a left ureter stone by Dr. Bains. Pt states she had an episode of urinary incontinence earlier today. She spoke with a nurse practitioner in the office and they advised her to pull the stent. Pt c/o nausea and vomiting since procedure. Pt took Percocet PTA without relief.     Duration:  Since 4:30PM  Onset: gradual  Timing: constant  Location: left flank  Quality: pain  Intensity/Severity: moderate  Progression: worsening  Associated Symptoms: nausea, vomiting  Aggravating Factors: removing urinary stent  Previous Episodes: Yesterday pt underwent cytoscopy, laser lithotripsy, stone basket extraction, and stent placement   Treatment before arrival: Pt took Percocet PTA without relief.     PAST MEDICAL HISTORY  Active Ambulatory Problems     Diagnosis Date Noted   • Pyelonephritis affecting pregnancy 07/27/2016   • Pyelonephritis affecting pregnancy in second trimester, antepartum 07/28/2016   • Ureteral calculus, left 03/15/2019     Resolved Ambulatory Problems     Diagnosis Date Noted   • Pregnancy 07/26/2016   • Pregnant 07/27/2016     Past Medical History:   Diagnosis Date   • Abnormal Pap smear of cervix    • Anxiety    • Depression    • Disease of thyroid gland    • Ureteral calculus, left 3/15/2019   • Urinary tract infection        PAST SURGICAL HISTORY  Past Surgical History:   Procedure Laterality Date   • ADENOIDECTOMY     • CERVICAL BIOPSY  W/ LOOP ELECTRODE EXCISION     • EAR TUBES     • TONSILLECTOMY     • WISDOM TOOTH EXTRACTION         FAMILY HISTORY  Family History    Problem Relation Age of Onset   • Hypertension Brother        SOCIAL HISTORY  Social History     Socioeconomic History   • Marital status:      Spouse name: Not on file   • Number of children: Not on file   • Years of education: Not on file   • Highest education level: Not on file   Social Needs   • Financial resource strain: Not on file   • Food insecurity - worry: Not on file   • Food insecurity - inability: Not on file   • Transportation needs - medical: Not on file   • Transportation needs - non-medical: Not on file   Occupational History   • Not on file   Tobacco Use   • Smoking status: Former Smoker   • Smokeless tobacco: Never Used   • Tobacco comment: quit 3 yrs ago   Substance and Sexual Activity   • Alcohol use: No     Comment: rare   • Drug use: No   • Sexual activity: Defer   Other Topics Concern   • Not on file   Social History Narrative   • Not on file       ALLERGIES  Sulfa antibiotics    REVIEW OF SYSTEMS  Review of Systems   Constitutional: Negative for fever.   HENT: Negative for sore throat.    Eyes: Negative.    Respiratory: Negative for cough and shortness of breath.    Cardiovascular: Negative for chest pain.   Gastrointestinal: Positive for nausea and vomiting. Negative for abdominal pain and diarrhea.   Genitourinary: Positive for flank pain (left). Negative for dysuria.        Urinary incontinence x1    Musculoskeletal: Negative for neck pain.   Skin: Negative for rash.   Neurological: Negative for weakness, numbness and headaches.   Hematological: Negative.    Psychiatric/Behavioral: Negative.    All other systems reviewed and are negative.      PHYSICAL EXAM  ED Triage Vitals [03/16/19 1753]   Temp Heart Rate Resp BP SpO2   97.9 °F (36.6 °C) 118 18 -- 95 %      Temp src Heart Rate Source Patient Position BP Location FiO2 (%)   -- -- -- -- --       Physical Exam   Constitutional: She is oriented to person, place, and time. No distress.   HENT:   Head: Normocephalic and atraumatic.    Mouth/Throat: Oropharynx is clear and moist.   Eyes: EOM are normal. Pupils are equal, round, and reactive to light.   Neck: Normal range of motion. Neck supple.   Cardiovascular: Regular rhythm and normal heart sounds. Tachycardia present.   Pulmonary/Chest: Effort normal and breath sounds normal. No respiratory distress.   Abdominal: Soft. There is tenderness (mild left abd). There is CVA tenderness (left). There is no rebound and no guarding.   Musculoskeletal: Normal range of motion. She exhibits no edema.   Neurological: She is alert and oriented to person, place, and time. She has normal sensation and normal strength.   Skin: Skin is warm and dry. No rash noted.   Psychiatric: Mood and affect normal.   Nursing note and vitals reviewed.      LAB RESULTS  Lab Results (last 24 hours)     Procedure Component Value Units Date/Time    Urinalysis With Microscopic If Indicated (No Culture) - Urine, Clean Catch [[id number]]  (Abnormal) Collected:  03/16/19 1834    Specimen:  Urine, Clean Catch Updated:  03/16/19 1855     Color, UA Orange     Comment: Any Substance that causes an abnormal urine color can alter the accuracy of the chemical reactions.        Appearance, UA Turbid     pH, UA 7.0     Specific Gravity, UA 1.012     Glucose, UA Negative     Ketones, UA Negative     Bilirubin, UA Negative     Blood, UA Large (3+)     Protein, UA >=300 mg/dL (3+)     Leuk Esterase, UA Moderate (2+)     Nitrite, UA Positive     Urobilinogen, UA 1.0 E.U./dL    Urinalysis, Microscopic Only - Urine, Clean Catch [[id number]]  (Abnormal) Collected:  03/16/19 1834    Specimen:  Urine, Clean Catch Updated:  03/16/19 1855     RBC, UA Too Numerous to Count /HPF      WBC, UA 13-20 /HPF      Bacteria, UA None Seen /HPF      Squamous Epithelial Cells, UA 0-2 /HPF      Hyaline Casts, UA None Seen /LPF      Methodology Automated Microscopy    CBC & Differential [[id number]] Collected:  03/16/19 1840    Specimen:  Blood Updated:  03/16/19 1854     Narrative:       The following orders were created for panel order CBC & Differential.  Procedure                               Abnormality         Status                     ---------                               -----------         ------                     CBC Auto Differential[[id number]]        Abnormal            Final result                 Please view results for these tests on the individual orders.    Basic Metabolic Panel [[id number]]  (Abnormal) Collected:  03/16/19 1840    Specimen:  Blood Updated:  03/16/19 1915     Glucose 108 mg/dL      BUN 7 mg/dL      Creatinine 0.74 mg/dL      Sodium 142 mmol/L      Potassium 3.2 mmol/L      Chloride 101 mmol/L      CO2 26.1 mmol/L      Calcium 8.8 mg/dL      eGFR Non African Amer 89 mL/min/1.73      BUN/Creatinine Ratio 9.5     Anion Gap 14.9 mmol/L     Narrative:       GFR Normal >60  Chronic Kidney Disease <60  Kidney Failure <15    CBC Auto Differential [[id number]]  (Abnormal) Collected:  03/16/19 1840    Specimen:  Blood Updated:  03/16/19 1854     WBC 10.21 10*3/mm3      RBC 4.46 10*6/mm3      Hemoglobin 13.1 g/dL      Hematocrit 40.3 %      MCV 90.4 fL      MCH 29.4 pg      MCHC 32.5 g/dL      RDW 12.9 %      RDW-SD 42.5 fl      MPV 10.1 fL      Platelets 309 10*3/mm3      Neutrophil % 59.9 %      Lymphocyte % 27.6 %      Monocyte % 10.8 %      Eosinophil % 1.1 %      Basophil % 0.4 %      Immature Grans % 0.2 %      Neutrophils, Absolute 6.12 10*3/mm3      Lymphocytes, Absolute 2.82 10*3/mm3      Monocytes, Absolute 1.10 10*3/mm3      Eosinophils, Absolute 0.11 10*3/mm3      Basophils, Absolute 0.04 10*3/mm3      Immature Grans, Absolute 0.02 10*3/mm3      nRBC 0.0 /100 WBC           I ordered the above labs and reviewed the results      PROCEDURES  Procedures      PROGRESS AND CONSULTS   6:11 PM  Labs ordered for evaluation. Zofran, Toradol, and Dilaudid ordered for pain. IV fluids ordered.     7:31 PM  Rechecked pt who is resting in NAD. Pt states  pain has improved but she c/o persistent nausea. Informed pt of labs results.   Phenergan ordered. Consult placed to urology.     7:48 PM  Discussed pt case with Dr. Marie, urology, who states pt can be discharged with f/u.     7:58 PM  Rechecked pt who is resting in NAD. Pt states nausea has improved. Pt has Phenergan at home that she can take. Discussed plan to discharge and advised her to call urologist in 2 days. Pt understands and agrees with the plan, all questions answered.      MEDICAL DECISION MAKING  Results were reviewed/discussed with the patient and they were also made aware of online access. Pt also made aware that some labs, such as cultures, will not be resulted during ER visit and follow up with PMD is necessary.     MDM  Number of Diagnoses or Management Options  Non-intractable vomiting with nausea, unspecified vomiting type:   Renal colic on left side:   Diagnosis management comments: Patient had a ureteral stent placed yesterday following lithotripsy.  She pulled the stent today after having some incontinence.  She presented to the ER complaining of left flank pain with nausea and vomiting.  Her workup was unremarkable except for blood in her urine.  Her pain resolved with IV Dilaudid and Toradol.  Nausea and vomiting resolved with Iv Zofran and Phenergan.  Case was discussed with Dr. Allen.  Patient will be discharged and advised to call her urologist in 2 days.       Amount and/or Complexity of Data Reviewed  Clinical lab tests: ordered and reviewed (Urinalysis- RBC too numerous to count, WBC 13-20, Bacteria none  WBC-10.21)  Decide to obtain previous medical records or to obtain history from someone other than the patient: yes  Review and summarize past medical records: yes (Yesterday pt underwent cytoscopy, laser lithotripsy, stone basket extraction, and stent placement for a left ureter stone by Dr. Bains.  Pt had negative HCG yesterday)  Discuss the patient with other providers:  yes (Dr. Marie, urology)           DIAGNOSIS  Final diagnoses:   Non-intractable vomiting with nausea, unspecified vomiting type   Renal colic on left side       DISPOSITION  DISCHARGE    Patient discharged in stable condition.    Reviewed implications of results, diagnosis, meds, responsibility to follow up, warning signs and symptoms of possible worsening, potential complications and reasons to return to ER.    Patient/Family voiced understanding of above instructions.    Discussed plan for discharge, as there is no emergent indication for admission. Patient referred to primary care provider for BP management due to today's BP. Pt/family is agreeable and understands need for follow up and repeat testing.  Pt is aware that discharge does not mean that nothing is wrong but it indicates no emergency is present that requires admission and they must continue care with follow-up as given below or physician of their choice.     FOLLOW-UP  Wilbur Bains MD  [address] [address]  [phone]    Call in 2 days           Medication List      No changes were made to your prescriptions during this visit.       Latest Documented Vital Signs:  As of 8:34 PM  BP- 133/77 HR- 65 Temp- 98.4 °F (36.9 °C) (Oral) O2 sat- 97%    --  Documentation assistance provided by mar Thurman for Dr. Storey.  Information recorded by the mar was done at my direction and has been verified and validated by me.       Allison Thurman  03/16/19 2002       Bruce Storey MD  03/16/19 2034

## 2019-03-17 NOTE — DISCHARGE INSTRUCTIONS
Take Phenergan as needed.  Drink plenty of fluids.  Call Dr. Bains's office in 2 days.  Return to emergency department for worsening pain, persistent vomiting, fever, or other concern.

## 2019-03-20 LAB
CA PHOS CRY STONE QL IR: 63 %
COD CRY STONE QL IR: 2 %
COLOR STONE: NORMAL
COM CRY STONE QL IR: 35 %
COMPN STONE: NORMAL
Lab: NORMAL
Lab: NORMAL
NIDUS STONE QL: NORMAL
PATH REPORT.COMMENTS IMP SPEC: NORMAL
SIZE STONE: NORMAL MM
WT STONE: 11.1 MG

## 2019-12-20 ENCOUNTER — LAB (OUTPATIENT)
Dept: LAB | Facility: HOSPITAL | Age: 34
End: 2019-12-20

## 2019-12-20 ENCOUNTER — TRANSCRIBE ORDERS (OUTPATIENT)
Dept: ADMINISTRATIVE | Facility: HOSPITAL | Age: 34
End: 2019-12-20

## 2019-12-20 DIAGNOSIS — O03.9 THREATENED ABORTION, DELIVERED: ICD-10-CM

## 2019-12-20 DIAGNOSIS — O60.00 PREMATURE LABOR AFTER 22 WEEKS AND BEFORE 37 WEEKS WITHOUT DELIVERY: Primary | ICD-10-CM

## 2019-12-20 DIAGNOSIS — O03.9 THREATENED ABORTION, DELIVERED: Primary | ICD-10-CM

## 2019-12-20 DIAGNOSIS — O60.00 PREMATURE LABOR AFTER 22 WEEKS AND BEFORE 37 WEEKS WITHOUT DELIVERY: ICD-10-CM

## 2019-12-20 LAB
HCG INTACT+B SERPL-ACNC: 2229 MIU/ML
PROGEST SERPL-MCNC: 17.7 NG/ML

## 2019-12-20 PROCEDURE — 84144 ASSAY OF PROGESTERONE: CPT

## 2019-12-20 PROCEDURE — 84702 CHORIONIC GONADOTROPIN TEST: CPT | Performed by: NURSE PRACTITIONER

## 2019-12-20 PROCEDURE — 36415 COLL VENOUS BLD VENIPUNCTURE: CPT | Performed by: NURSE PRACTITIONER

## 2019-12-22 ENCOUNTER — APPOINTMENT (OUTPATIENT)
Dept: LAB | Facility: HOSPITAL | Age: 34
End: 2019-12-22

## 2019-12-22 LAB
HCG INTACT+B SERPL-ACNC: 2663 MIU/ML
PROGEST SERPL-MCNC: 12.8 NG/ML

## 2019-12-22 PROCEDURE — 36415 COLL VENOUS BLD VENIPUNCTURE: CPT | Performed by: NURSE PRACTITIONER

## 2019-12-22 PROCEDURE — 84144 ASSAY OF PROGESTERONE: CPT

## 2019-12-22 PROCEDURE — 84702 CHORIONIC GONADOTROPIN TEST: CPT | Performed by: NURSE PRACTITIONER

## 2021-03-19 ENCOUNTER — IMMUNIZATION (OUTPATIENT)
Dept: VACCINE CLINIC | Facility: HOSPITAL | Age: 36
End: 2021-03-19

## 2021-03-19 PROCEDURE — 0001A: CPT | Performed by: OBSTETRICS & GYNECOLOGY

## 2021-03-19 PROCEDURE — 91300 HC SARSCOV02 VAC 30MCG/0.3ML IM: CPT | Performed by: OBSTETRICS & GYNECOLOGY

## 2021-04-09 ENCOUNTER — IMMUNIZATION (OUTPATIENT)
Dept: VACCINE CLINIC | Facility: HOSPITAL | Age: 36
End: 2021-04-09

## 2021-04-09 PROCEDURE — 0002A: CPT | Performed by: OBSTETRICS & GYNECOLOGY

## 2021-04-09 PROCEDURE — 91300 HC SARSCOV02 VAC 30MCG/0.3ML IM: CPT | Performed by: OBSTETRICS & GYNECOLOGY

## 2022-01-05 LAB
EXTERNAL ABO GROUPING: NORMAL
EXTERNAL ANTIBODY SCREEN: NEGATIVE
EXTERNAL HEPATITIS B SURFACE ANTIGEN: NEGATIVE
EXTERNAL HEPATITIS C AB: NON REACTIVE
EXTERNAL RH FACTOR: NEGATIVE
EXTERNAL RUBELLA QUALITATIVE: NORMAL
EXTERNAL SYPHILIS RPR SCREEN: NORMAL
HIV1 P24 AG SERPL QL IA: NORMAL

## 2022-05-16 LAB — EXTERNAL GTT 1 HOUR: 131

## 2022-05-20 ENCOUNTER — HOSPITAL ENCOUNTER (INPATIENT)
Facility: HOSPITAL | Age: 37
LOS: 18 days | Discharge: HOME | End: 2022-06-08
Attending: OBSTETRICS & GYNECOLOGY | Admitting: OBSTETRICS & GYNECOLOGY

## 2022-05-20 DIAGNOSIS — N20.1 URETERAL CALCULUS, LEFT: ICD-10-CM

## 2022-05-20 PROBLEM — Z34.90 PREGNANCY: Status: ACTIVE | Noted: 2022-05-20

## 2022-05-20 LAB
A1 MICROGLOB PLACENTAL VAG QL: POSITIVE
BASOPHILS # BLD AUTO: 0.02 10*3/MM3 (ref 0–0.2)
BASOPHILS NFR BLD AUTO: 0.2 % (ref 0–1.5)
BILIRUB UR QL STRIP: NEGATIVE
CLARITY UR: CLEAR
COLOR UR: YELLOW
DEPRECATED RDW RBC AUTO: 44.1 FL (ref 37–54)
EOSINOPHIL # BLD AUTO: 0.16 10*3/MM3 (ref 0–0.4)
EOSINOPHIL NFR BLD AUTO: 1.6 % (ref 0.3–6.2)
ERYTHROCYTE [DISTWIDTH] IN BLOOD BY AUTOMATED COUNT: 12.9 % (ref 12.3–15.4)
GLUCOSE UR STRIP-MCNC: NEGATIVE MG/DL
HCT VFR BLD AUTO: 37.4 % (ref 34–46.6)
HGB BLD-MCNC: 12.1 G/DL (ref 12–15.9)
HGB UR QL STRIP.AUTO: NEGATIVE
IMM GRANULOCYTES # BLD AUTO: 0.06 10*3/MM3 (ref 0–0.05)
IMM GRANULOCYTES NFR BLD AUTO: 0.6 % (ref 0–0.5)
KETONES UR QL STRIP: NEGATIVE
LEUKOCYTE ESTERASE UR QL STRIP.AUTO: NEGATIVE
LYMPHOCYTES # BLD AUTO: 1.62 10*3/MM3 (ref 0.7–3.1)
LYMPHOCYTES NFR BLD AUTO: 15.8 % (ref 19.6–45.3)
MCH RBC QN AUTO: 30.2 PG (ref 26.6–33)
MCHC RBC AUTO-ENTMCNC: 32.4 G/DL (ref 31.5–35.7)
MCV RBC AUTO: 93.3 FL (ref 79–97)
MONOCYTES # BLD AUTO: 0.97 10*3/MM3 (ref 0.1–0.9)
MONOCYTES NFR BLD AUTO: 9.5 % (ref 5–12)
NEUTROPHILS NFR BLD AUTO: 7.43 10*3/MM3 (ref 1.7–7)
NEUTROPHILS NFR BLD AUTO: 72.3 % (ref 42.7–76)
NITRITE UR QL STRIP: NEGATIVE
NRBC BLD AUTO-RTO: 0 /100 WBC (ref 0–0.2)
PH UR STRIP.AUTO: 7 [PH] (ref 5–8)
PLATELET # BLD AUTO: 284 10*3/MM3 (ref 140–450)
PMV BLD AUTO: 10.2 FL (ref 6–12)
PROT UR QL STRIP: NEGATIVE
RBC # BLD AUTO: 4.01 10*6/MM3 (ref 3.77–5.28)
SARS-COV-2 RNA RESP QL NAA+PROBE: NOT DETECTED
SP GR UR STRIP: 1.01 (ref 1–1.03)
UROBILINOGEN UR QL STRIP: NORMAL
WBC NRBC COR # BLD: 10.26 10*3/MM3 (ref 3.4–10.8)

## 2022-05-20 PROCEDURE — 25010000002 BETAMETHASONE ACET & SOD PHOS PER 4 MG: Performed by: OBSTETRICS & GYNECOLOGY

## 2022-05-20 PROCEDURE — 86900 BLOOD TYPING SEROLOGIC ABO: CPT | Performed by: OBSTETRICS & GYNECOLOGY

## 2022-05-20 PROCEDURE — U0003 INFECTIOUS AGENT DETECTION BY NUCLEIC ACID (DNA OR RNA); SEVERE ACUTE RESPIRATORY SYNDROME CORONAVIRUS 2 (SARS-COV-2) (CORONAVIRUS DISEASE [COVID-19]), AMPLIFIED PROBE TECHNIQUE, MAKING USE OF HIGH THROUGHPUT TECHNOLOGIES AS DESCRIBED BY CMS-2020-01-R: HCPCS | Performed by: OBSTETRICS & GYNECOLOGY

## 2022-05-20 PROCEDURE — 99202 OFFICE O/P NEW SF 15 MIN: CPT | Performed by: OBSTETRICS & GYNECOLOGY

## 2022-05-20 PROCEDURE — 85025 COMPLETE CBC W/AUTO DIFF WBC: CPT | Performed by: OBSTETRICS & GYNECOLOGY

## 2022-05-20 PROCEDURE — 84112 EVAL AMNIOTIC FLUID PROTEIN: CPT | Performed by: OBSTETRICS & GYNECOLOGY

## 2022-05-20 PROCEDURE — 86901 BLOOD TYPING SEROLOGIC RH(D): CPT | Performed by: OBSTETRICS & GYNECOLOGY

## 2022-05-20 PROCEDURE — 86850 RBC ANTIBODY SCREEN: CPT | Performed by: OBSTETRICS & GYNECOLOGY

## 2022-05-20 PROCEDURE — 81003 URINALYSIS AUTO W/O SCOPE: CPT | Performed by: OBSTETRICS & GYNECOLOGY

## 2022-05-20 PROCEDURE — 86870 RBC ANTIBODY IDENTIFICATION: CPT | Performed by: OBSTETRICS & GYNECOLOGY

## 2022-05-20 RX ORDER — DIPHENHYDRAMINE HCL 25 MG
25 CAPSULE ORAL NIGHTLY PRN
Status: DISCONTINUED | OUTPATIENT
Start: 2022-05-20 | End: 2022-06-04

## 2022-05-20 RX ORDER — SODIUM CHLORIDE 0.9 % (FLUSH) 0.9 %
10 SYRINGE (ML) INJECTION AS NEEDED
Status: DISCONTINUED | OUTPATIENT
Start: 2022-05-20 | End: 2022-06-04

## 2022-05-20 RX ORDER — SODIUM CHLORIDE 0.9 % (FLUSH) 0.9 %
10 SYRINGE (ML) INJECTION EVERY 12 HOURS SCHEDULED
Status: DISCONTINUED | OUTPATIENT
Start: 2022-05-20 | End: 2022-06-04

## 2022-05-20 RX ORDER — ACETAMINOPHEN 325 MG/1
650 TABLET ORAL EVERY 4 HOURS PRN
Status: DISCONTINUED | OUTPATIENT
Start: 2022-05-20 | End: 2022-06-04

## 2022-05-20 RX ORDER — DIPHENHYDRAMINE HYDROCHLORIDE 50 MG/ML
25 INJECTION INTRAMUSCULAR; INTRAVENOUS EVERY 6 HOURS PRN
Status: DISCONTINUED | OUTPATIENT
Start: 2022-05-20 | End: 2022-05-20

## 2022-05-20 RX ORDER — BETAMETHASONE SODIUM PHOSPHATE AND BETAMETHASONE ACETATE 3; 3 MG/ML; MG/ML
12 INJECTION, SUSPENSION INTRA-ARTICULAR; INTRALESIONAL; INTRAMUSCULAR; SOFT TISSUE EVERY 24 HOURS
Status: COMPLETED | OUTPATIENT
Start: 2022-05-20 | End: 2022-05-21

## 2022-05-20 RX ADMIN — BETAMETHASONE SODIUM PHOSPHATE AND BETAMETHASONE ACETATE 12 MG: 3; 3 INJECTION, SUSPENSION INTRA-ARTICULAR; INTRALESIONAL; INTRAMUSCULAR at 23:28

## 2022-05-21 LAB
ABO GROUP BLD: NORMAL
ALBUMIN SERPL-MCNC: 3.3 G/DL (ref 3.5–5.2)
ALBUMIN/GLOB SERPL: 1.6 G/DL
ALP SERPL-CCNC: 98 U/L (ref 39–117)
ALT SERPL W P-5'-P-CCNC: 13 U/L (ref 1–33)
ANION GAP SERPL CALCULATED.3IONS-SCNC: 12 MMOL/L (ref 5–15)
AST SERPL-CCNC: 13 U/L (ref 1–32)
BILIRUB SERPL-MCNC: 0.2 MG/DL (ref 0–1.2)
BLD GP AB SCN SERPL QL: POSITIVE
BUN SERPL-MCNC: 6 MG/DL (ref 6–20)
BUN/CREAT SERPL: 14.6 (ref 7–25)
CALCIUM SPEC-SCNC: 8.5 MG/DL (ref 8.6–10.5)
CHLORIDE SERPL-SCNC: 107 MMOL/L (ref 98–107)
CO2 SERPL-SCNC: 20 MMOL/L (ref 22–29)
CREAT SERPL-MCNC: 0.41 MG/DL (ref 0.57–1)
EGFRCR SERPLBLD CKD-EPI 2021: 128.5 ML/MIN/1.73
GLOBULIN UR ELPH-MCNC: 2.1 GM/DL
GLUCOSE SERPL-MCNC: 96 MG/DL (ref 65–99)
POTASSIUM SERPL-SCNC: 3.8 MMOL/L (ref 3.5–5.2)
PROT SERPL-MCNC: 5.4 G/DL (ref 6–8.5)
RESIDUAL RHIG DETECTED: NORMAL
RH BLD: NEGATIVE
SODIUM SERPL-SCNC: 139 MMOL/L (ref 136–145)
T&S EXPIRATION DATE: NORMAL

## 2022-05-21 PROCEDURE — 59025 FETAL NON-STRESS TEST: CPT

## 2022-05-21 PROCEDURE — 80053 COMPREHEN METABOLIC PANEL: CPT | Performed by: OBSTETRICS & GYNECOLOGY

## 2022-05-21 PROCEDURE — 25010000002 BETAMETHASONE ACET & SOD PHOS PER 4 MG: Performed by: OBSTETRICS & GYNECOLOGY

## 2022-05-21 PROCEDURE — 25010000002 AZITHROMYCIN PER 500 MG: Performed by: OBSTETRICS & GYNECOLOGY

## 2022-05-21 PROCEDURE — 25010000002 AMPICILLIN PER 500 MG: Performed by: OBSTETRICS & GYNECOLOGY

## 2022-05-21 RX ORDER — SERTRALINE HYDROCHLORIDE 100 MG/1
100 TABLET, FILM COATED ORAL DAILY
Status: DISCONTINUED | OUTPATIENT
Start: 2022-05-21 | End: 2022-06-04

## 2022-05-21 RX ORDER — PRENATAL VIT/IRON FUM/FOLIC AC 27MG-0.8MG
1 TABLET ORAL DAILY
Status: DISCONTINUED | OUTPATIENT
Start: 2022-05-21 | End: 2022-06-04

## 2022-05-21 RX ORDER — PANTOPRAZOLE SODIUM 40 MG/1
40 TABLET, DELAYED RELEASE ORAL
Status: DISCONTINUED | OUTPATIENT
Start: 2022-05-21 | End: 2022-06-04

## 2022-05-21 RX ORDER — LEVOTHYROXINE SODIUM 0.07 MG/1
75 TABLET ORAL EVERY EVENING
Status: DISCONTINUED | OUTPATIENT
Start: 2022-05-21 | End: 2022-05-22

## 2022-05-21 RX ORDER — CETIRIZINE HYDROCHLORIDE 10 MG/1
10 TABLET ORAL DAILY
Status: DISCONTINUED | OUTPATIENT
Start: 2022-05-21 | End: 2022-06-04

## 2022-05-21 RX ADMIN — LEVOTHYROXINE SODIUM 75 MCG: 0.07 TABLET ORAL at 21:01

## 2022-05-21 RX ADMIN — AMPICILLIN 2 G: 2 INJECTION, POWDER, FOR SOLUTION INTRAVENOUS at 12:25

## 2022-05-21 RX ADMIN — SERTRALINE 100 MG: 100 TABLET, FILM COATED ORAL at 08:40

## 2022-05-21 RX ADMIN — Medication 1 TABLET: at 08:40

## 2022-05-21 RX ADMIN — LEVOTHYROXINE SODIUM 75 MCG: 0.07 TABLET ORAL at 00:47

## 2022-05-21 RX ADMIN — AMPICILLIN 2 G: 2 INJECTION, POWDER, FOR SOLUTION INTRAVENOUS at 06:38

## 2022-05-21 RX ADMIN — AZITHROMYCIN DIHYDRATE 500 MG: 500 INJECTION, POWDER, LYOPHILIZED, FOR SOLUTION INTRAVENOUS at 01:38

## 2022-05-21 RX ADMIN — ACETAMINOPHEN 650 MG: 325 TABLET, FILM COATED ORAL at 12:28

## 2022-05-21 RX ADMIN — CETIRIZINE HYDROCHLORIDE 10 MG: 10 TABLET ORAL at 08:40

## 2022-05-21 RX ADMIN — PANTOPRAZOLE SODIUM 40 MG: 40 TABLET, DELAYED RELEASE ORAL at 06:29

## 2022-05-21 RX ADMIN — BETAMETHASONE SODIUM PHOSPHATE AND BETAMETHASONE ACETATE 12 MG: 3; 3 INJECTION, SUSPENSION INTRA-ARTICULAR; INTRALESIONAL; INTRAMUSCULAR at 23:57

## 2022-05-21 RX ADMIN — AMPICILLIN 2 G: 2 INJECTION, POWDER, FOR SOLUTION INTRAVENOUS at 00:40

## 2022-05-21 RX ADMIN — AMPICILLIN 2 G: 2 INJECTION, POWDER, FOR SOLUTION INTRAVENOUS at 23:58

## 2022-05-21 RX ADMIN — AMPICILLIN 2 G: 2 INJECTION, POWDER, FOR SOLUTION INTRAVENOUS at 18:36

## 2022-05-21 RX ADMIN — Medication 10 ML: at 08:28

## 2022-05-22 PROCEDURE — 59025 FETAL NON-STRESS TEST: CPT

## 2022-05-22 PROCEDURE — 63710000001 DIPHENHYDRAMINE PER 50 MG: Performed by: OBSTETRICS & GYNECOLOGY

## 2022-05-22 PROCEDURE — 25010000002 AZITHROMYCIN PER 500 MG: Performed by: OBSTETRICS & GYNECOLOGY

## 2022-05-22 PROCEDURE — 25010000002 AMPICILLIN PER 500 MG: Performed by: OBSTETRICS & GYNECOLOGY

## 2022-05-22 RX ORDER — FAMOTIDINE 10 MG/ML
20 INJECTION, SOLUTION INTRAVENOUS 2 TIMES DAILY PRN
Status: DISCONTINUED | OUTPATIENT
Start: 2022-05-22 | End: 2022-06-04

## 2022-05-22 RX ORDER — AMOXICILLIN 875 MG/1
875 TABLET, COATED ORAL EVERY 12 HOURS SCHEDULED
Status: COMPLETED | OUTPATIENT
Start: 2022-05-22 | End: 2022-05-27

## 2022-05-22 RX ORDER — LEVOTHYROXINE SODIUM 88 UG/1
88 TABLET ORAL EVERY EVENING
Status: DISCONTINUED | OUTPATIENT
Start: 2022-05-22 | End: 2022-05-22

## 2022-05-22 RX ORDER — LEVOTHYROXINE SODIUM 88 UG/1
176 TABLET ORAL WEEKLY
Status: DISCONTINUED | OUTPATIENT
Start: 2022-05-22 | End: 2022-05-22

## 2022-05-22 RX ORDER — LEVOTHYROXINE SODIUM 88 UG/1
176 TABLET ORAL
Status: DISCONTINUED | OUTPATIENT
Start: 2022-05-22 | End: 2022-06-04

## 2022-05-22 RX ORDER — LEVOTHYROXINE SODIUM 88 UG/1
88 TABLET ORAL
Status: DISCONTINUED | OUTPATIENT
Start: 2022-05-23 | End: 2022-06-04

## 2022-05-22 RX ADMIN — CETIRIZINE HYDROCHLORIDE 10 MG: 10 TABLET ORAL at 11:59

## 2022-05-22 RX ADMIN — AMPICILLIN 2 G: 2 INJECTION, POWDER, FOR SOLUTION INTRAVENOUS at 06:14

## 2022-05-22 RX ADMIN — DIPHENHYDRAMINE HYDROCHLORIDE 25 MG: 25 CAPSULE ORAL at 22:13

## 2022-05-22 RX ADMIN — AZITHROMYCIN DIHYDRATE 500 MG: 500 INJECTION, POWDER, LYOPHILIZED, FOR SOLUTION INTRAVENOUS at 01:30

## 2022-05-22 RX ADMIN — AMPICILLIN 2 G: 2 INJECTION, POWDER, FOR SOLUTION INTRAVENOUS at 12:48

## 2022-05-22 RX ADMIN — FAMOTIDINE 20 MG: 10 INJECTION INTRAVENOUS at 21:52

## 2022-05-22 RX ADMIN — SERTRALINE 100 MG: 100 TABLET, FILM COATED ORAL at 12:52

## 2022-05-22 RX ADMIN — PANTOPRAZOLE SODIUM 40 MG: 40 TABLET, DELAYED RELEASE ORAL at 06:14

## 2022-05-22 RX ADMIN — LEVOTHYROXINE SODIUM 176 MCG: 0.09 TABLET ORAL at 21:07

## 2022-05-22 RX ADMIN — AMOXICILLIN 875 MG: 875 TABLET, FILM COATED ORAL at 21:07

## 2022-05-22 RX ADMIN — Medication 10 ML: at 21:08

## 2022-05-22 RX ADMIN — Medication 1 TABLET: at 11:59

## 2022-05-23 ENCOUNTER — APPOINTMENT (OUTPATIENT)
Dept: ULTRASOUND IMAGING | Facility: HOSPITAL | Age: 37
End: 2022-05-23

## 2022-05-23 LAB
ABO GROUP BLD: NORMAL
BLD GP AB SCN SERPL QL: POSITIVE
RH BLD: NEGATIVE
T&S EXPIRATION DATE: NORMAL

## 2022-05-23 PROCEDURE — 76811 OB US DETAILED SNGL FETUS: CPT | Performed by: OBSTETRICS & GYNECOLOGY

## 2022-05-23 PROCEDURE — 76819 FETAL BIOPHYS PROFIL W/O NST: CPT | Performed by: OBSTETRICS & GYNECOLOGY

## 2022-05-23 PROCEDURE — 86900 BLOOD TYPING SEROLOGIC ABO: CPT | Performed by: OBSTETRICS & GYNECOLOGY

## 2022-05-23 PROCEDURE — 76811 OB US DETAILED SNGL FETUS: CPT

## 2022-05-23 PROCEDURE — 86870 RBC ANTIBODY IDENTIFICATION: CPT | Performed by: OBSTETRICS & GYNECOLOGY

## 2022-05-23 PROCEDURE — 59025 FETAL NON-STRESS TEST: CPT

## 2022-05-23 PROCEDURE — 76816 OB US FOLLOW-UP PER FETUS: CPT

## 2022-05-23 PROCEDURE — 86850 RBC ANTIBODY SCREEN: CPT | Performed by: OBSTETRICS & GYNECOLOGY

## 2022-05-23 PROCEDURE — 76819 FETAL BIOPHYS PROFIL W/O NST: CPT

## 2022-05-23 PROCEDURE — 86901 BLOOD TYPING SEROLOGIC RH(D): CPT | Performed by: OBSTETRICS & GYNECOLOGY

## 2022-05-23 RX ADMIN — SERTRALINE 100 MG: 100 TABLET, FILM COATED ORAL at 09:01

## 2022-05-23 RX ADMIN — Medication 10 ML: at 09:02

## 2022-05-23 RX ADMIN — LEVOTHYROXINE SODIUM 88 MCG: 0.09 TABLET ORAL at 20:42

## 2022-05-23 RX ADMIN — CETIRIZINE HYDROCHLORIDE 10 MG: 10 TABLET ORAL at 09:01

## 2022-05-23 RX ADMIN — AMOXICILLIN 875 MG: 875 TABLET, FILM COATED ORAL at 09:01

## 2022-05-23 RX ADMIN — FAMOTIDINE 20 MG: 10 INJECTION INTRAVENOUS at 20:45

## 2022-05-23 RX ADMIN — PANTOPRAZOLE SODIUM 40 MG: 40 TABLET, DELAYED RELEASE ORAL at 06:16

## 2022-05-23 RX ADMIN — AMOXICILLIN 875 MG: 875 TABLET, FILM COATED ORAL at 20:42

## 2022-05-23 RX ADMIN — Medication 1 TABLET: at 09:01

## 2022-05-23 RX ADMIN — Medication 10 ML: at 20:47

## 2022-05-24 LAB — RESIDUAL RHIG DETECTED: NORMAL

## 2022-05-24 PROCEDURE — 90791 PSYCH DIAGNOSTIC EVALUATION: CPT

## 2022-05-24 PROCEDURE — 59025 FETAL NON-STRESS TEST: CPT

## 2022-05-24 RX ADMIN — AMOXICILLIN 875 MG: 875 TABLET, FILM COATED ORAL at 20:48

## 2022-05-24 RX ADMIN — Medication 10 ML: at 20:46

## 2022-05-24 RX ADMIN — Medication 1 TABLET: at 08:49

## 2022-05-24 RX ADMIN — PANTOPRAZOLE SODIUM 40 MG: 40 TABLET, DELAYED RELEASE ORAL at 05:57

## 2022-05-24 RX ADMIN — LEVOTHYROXINE SODIUM 88 MCG: 0.09 TABLET ORAL at 20:48

## 2022-05-24 RX ADMIN — Medication 10 ML: at 08:49

## 2022-05-24 RX ADMIN — CETIRIZINE HYDROCHLORIDE 10 MG: 10 TABLET ORAL at 08:49

## 2022-05-24 RX ADMIN — AMOXICILLIN 875 MG: 875 TABLET, FILM COATED ORAL at 08:49

## 2022-05-24 RX ADMIN — SERTRALINE 100 MG: 100 TABLET, FILM COATED ORAL at 08:49

## 2022-05-25 PROCEDURE — 59025 FETAL NON-STRESS TEST: CPT

## 2022-05-25 RX ADMIN — AMOXICILLIN 875 MG: 875 TABLET, FILM COATED ORAL at 09:44

## 2022-05-25 RX ADMIN — Medication 10 ML: at 21:04

## 2022-05-25 RX ADMIN — CETIRIZINE HYDROCHLORIDE 10 MG: 10 TABLET ORAL at 09:44

## 2022-05-25 RX ADMIN — SERTRALINE 100 MG: 100 TABLET, FILM COATED ORAL at 09:44

## 2022-05-25 RX ADMIN — AMOXICILLIN 875 MG: 875 TABLET, FILM COATED ORAL at 21:02

## 2022-05-25 RX ADMIN — PANTOPRAZOLE SODIUM 40 MG: 40 TABLET, DELAYED RELEASE ORAL at 06:34

## 2022-05-25 RX ADMIN — LEVOTHYROXINE SODIUM 88 MCG: 0.09 TABLET ORAL at 21:03

## 2022-05-25 RX ADMIN — Medication 1 TABLET: at 09:44

## 2022-05-25 RX ADMIN — Medication 10 ML: at 09:53

## 2022-05-26 ENCOUNTER — APPOINTMENT (OUTPATIENT)
Dept: ULTRASOUND IMAGING | Facility: HOSPITAL | Age: 37
End: 2022-05-26

## 2022-05-26 LAB
ABO GROUP BLD: NORMAL
BASOPHILS # BLD AUTO: 0.02 10*3/MM3 (ref 0–0.2)
BASOPHILS NFR BLD AUTO: 0.2 % (ref 0–1.5)
BLD GP AB SCN SERPL QL: POSITIVE
DEPRECATED RDW RBC AUTO: 44.7 FL (ref 37–54)
EOSINOPHIL # BLD AUTO: 0.13 10*3/MM3 (ref 0–0.4)
EOSINOPHIL NFR BLD AUTO: 1.2 % (ref 0.3–6.2)
ERYTHROCYTE [DISTWIDTH] IN BLOOD BY AUTOMATED COUNT: 12.8 % (ref 12.3–15.4)
HCT VFR BLD AUTO: 38.1 % (ref 34–46.6)
HGB BLD-MCNC: 12.4 G/DL (ref 12–15.9)
IMM GRANULOCYTES # BLD AUTO: 0.08 10*3/MM3 (ref 0–0.05)
IMM GRANULOCYTES NFR BLD AUTO: 0.8 % (ref 0–0.5)
LYMPHOCYTES # BLD AUTO: 2.14 10*3/MM3 (ref 0.7–3.1)
LYMPHOCYTES NFR BLD AUTO: 20.2 % (ref 19.6–45.3)
MCH RBC QN AUTO: 30.7 PG (ref 26.6–33)
MCHC RBC AUTO-ENTMCNC: 32.5 G/DL (ref 31.5–35.7)
MCV RBC AUTO: 94.3 FL (ref 79–97)
MONOCYTES # BLD AUTO: 1.06 10*3/MM3 (ref 0.1–0.9)
MONOCYTES NFR BLD AUTO: 10 % (ref 5–12)
NEUTROPHILS NFR BLD AUTO: 67.6 % (ref 42.7–76)
NEUTROPHILS NFR BLD AUTO: 7.15 10*3/MM3 (ref 1.7–7)
NRBC BLD AUTO-RTO: 0 /100 WBC (ref 0–0.2)
PLATELET # BLD AUTO: 275 10*3/MM3 (ref 140–450)
PMV BLD AUTO: 10.1 FL (ref 6–12)
RBC # BLD AUTO: 4.04 10*6/MM3 (ref 3.77–5.28)
RESIDUAL RHIG DETECTED: NORMAL
RH BLD: NEGATIVE
T&S EXPIRATION DATE: NORMAL
WBC NRBC COR # BLD: 10.58 10*3/MM3 (ref 3.4–10.8)

## 2022-05-26 PROCEDURE — 86901 BLOOD TYPING SEROLOGIC RH(D): CPT | Performed by: OBSTETRICS & GYNECOLOGY

## 2022-05-26 PROCEDURE — 86900 BLOOD TYPING SEROLOGIC ABO: CPT | Performed by: OBSTETRICS & GYNECOLOGY

## 2022-05-26 PROCEDURE — 85025 COMPLETE CBC W/AUTO DIFF WBC: CPT | Performed by: OBSTETRICS & GYNECOLOGY

## 2022-05-26 PROCEDURE — 76819 FETAL BIOPHYS PROFIL W/O NST: CPT | Performed by: OBSTETRICS & GYNECOLOGY

## 2022-05-26 PROCEDURE — 59025 FETAL NON-STRESS TEST: CPT

## 2022-05-26 PROCEDURE — 86850 RBC ANTIBODY SCREEN: CPT | Performed by: OBSTETRICS & GYNECOLOGY

## 2022-05-26 PROCEDURE — 76819 FETAL BIOPHYS PROFIL W/O NST: CPT

## 2022-05-26 PROCEDURE — 86870 RBC ANTIBODY IDENTIFICATION: CPT | Performed by: OBSTETRICS & GYNECOLOGY

## 2022-05-26 RX ADMIN — PANTOPRAZOLE SODIUM 40 MG: 40 TABLET, DELAYED RELEASE ORAL at 06:55

## 2022-05-26 RX ADMIN — SERTRALINE 100 MG: 100 TABLET, FILM COATED ORAL at 09:09

## 2022-05-26 RX ADMIN — AMOXICILLIN 875 MG: 875 TABLET, FILM COATED ORAL at 09:09

## 2022-05-26 RX ADMIN — Medication 10 ML: at 21:39

## 2022-05-26 RX ADMIN — Medication 10 ML: at 09:14

## 2022-05-26 RX ADMIN — CETIRIZINE HYDROCHLORIDE 10 MG: 10 TABLET ORAL at 09:09

## 2022-05-26 RX ADMIN — LEVOTHYROXINE SODIUM 88 MCG: 0.09 TABLET ORAL at 21:40

## 2022-05-26 RX ADMIN — Medication 1 TABLET: at 09:09

## 2022-05-26 RX ADMIN — AMOXICILLIN 875 MG: 875 TABLET, FILM COATED ORAL at 21:40

## 2022-05-27 PROCEDURE — 59025 FETAL NON-STRESS TEST: CPT

## 2022-05-27 RX ADMIN — Medication 1 TABLET: at 09:03

## 2022-05-27 RX ADMIN — AMOXICILLIN 875 MG: 875 TABLET, FILM COATED ORAL at 09:03

## 2022-05-27 RX ADMIN — Medication 10 ML: at 20:43

## 2022-05-27 RX ADMIN — SERTRALINE 100 MG: 100 TABLET, FILM COATED ORAL at 09:02

## 2022-05-27 RX ADMIN — Medication 10 ML: at 09:03

## 2022-05-27 RX ADMIN — CETIRIZINE HYDROCHLORIDE 10 MG: 10 TABLET ORAL at 09:03

## 2022-05-27 RX ADMIN — PANTOPRAZOLE SODIUM 40 MG: 40 TABLET, DELAYED RELEASE ORAL at 06:20

## 2022-05-28 PROCEDURE — 59025 FETAL NON-STRESS TEST: CPT

## 2022-05-28 RX ADMIN — SERTRALINE 100 MG: 100 TABLET, FILM COATED ORAL at 08:41

## 2022-05-28 RX ADMIN — LEVOTHYROXINE SODIUM 88 MCG: 0.09 TABLET ORAL at 00:26

## 2022-05-28 RX ADMIN — CETIRIZINE HYDROCHLORIDE 10 MG: 10 TABLET ORAL at 08:41

## 2022-05-28 RX ADMIN — Medication 10 ML: at 20:38

## 2022-05-28 RX ADMIN — Medication 10 ML: at 08:41

## 2022-05-28 RX ADMIN — Medication 1 TABLET: at 08:41

## 2022-05-28 RX ADMIN — LEVOTHYROXINE SODIUM 88 MCG: 0.09 TABLET ORAL at 20:38

## 2022-05-28 RX ADMIN — PANTOPRAZOLE SODIUM 40 MG: 40 TABLET, DELAYED RELEASE ORAL at 08:41

## 2022-05-29 PROBLEM — O09.529 AMA (ADVANCED MATERNAL AGE) MULTIGRAVIDA 35+: Status: ACTIVE | Noted: 2022-05-29

## 2022-05-29 PROBLEM — O09.813 PREGNANCY RESULTING FROM IN VITRO FERTILIZATION IN THIRD TRIMESTER: Status: ACTIVE | Noted: 2022-05-29

## 2022-05-29 PROBLEM — O09.519 AMA (ADVANCED MATERNAL AGE) PRIMIGRAVIDA 35+: Status: ACTIVE | Noted: 2022-05-29

## 2022-05-29 PROCEDURE — 59025 FETAL NON-STRESS TEST: CPT

## 2022-05-29 RX ADMIN — SERTRALINE 100 MG: 100 TABLET, FILM COATED ORAL at 08:56

## 2022-05-29 RX ADMIN — LEVOTHYROXINE SODIUM 176 MCG: 0.09 TABLET ORAL at 20:39

## 2022-05-29 RX ADMIN — Medication 1 TABLET: at 08:56

## 2022-05-29 RX ADMIN — Medication 10 ML: at 20:40

## 2022-05-29 RX ADMIN — CETIRIZINE HYDROCHLORIDE 10 MG: 10 TABLET ORAL at 08:56

## 2022-05-29 RX ADMIN — PANTOPRAZOLE SODIUM 40 MG: 40 TABLET, DELAYED RELEASE ORAL at 08:56

## 2022-05-29 RX ADMIN — Medication 10 ML: at 08:56

## 2022-05-30 LAB
ABO GROUP BLD: NORMAL
BLD GP AB SCN SERPL QL: POSITIVE
RESIDUAL RHIG DETECTED: NORMAL
RH BLD: NEGATIVE
T&S EXPIRATION DATE: NORMAL

## 2022-05-30 PROCEDURE — 59025 FETAL NON-STRESS TEST: CPT

## 2022-05-30 PROCEDURE — 86900 BLOOD TYPING SEROLOGIC ABO: CPT | Performed by: OBSTETRICS & GYNECOLOGY

## 2022-05-30 PROCEDURE — 86870 RBC ANTIBODY IDENTIFICATION: CPT | Performed by: OBSTETRICS & GYNECOLOGY

## 2022-05-30 PROCEDURE — 86850 RBC ANTIBODY SCREEN: CPT | Performed by: OBSTETRICS & GYNECOLOGY

## 2022-05-30 PROCEDURE — 86901 BLOOD TYPING SEROLOGIC RH(D): CPT | Performed by: OBSTETRICS & GYNECOLOGY

## 2022-05-30 RX ADMIN — SERTRALINE 100 MG: 100 TABLET, FILM COATED ORAL at 11:54

## 2022-05-30 RX ADMIN — CETIRIZINE HYDROCHLORIDE 10 MG: 10 TABLET ORAL at 11:54

## 2022-05-30 RX ADMIN — FAMOTIDINE 20 MG: 10 INJECTION INTRAVENOUS at 20:01

## 2022-05-30 RX ADMIN — Medication 10 ML: at 11:13

## 2022-05-30 RX ADMIN — Medication 1 TABLET: at 11:11

## 2022-05-30 RX ADMIN — LEVOTHYROXINE SODIUM 88 MCG: 0.09 TABLET ORAL at 20:41

## 2022-05-30 RX ADMIN — Medication 10 ML: at 20:00

## 2022-05-30 RX ADMIN — PANTOPRAZOLE SODIUM 40 MG: 40 TABLET, DELAYED RELEASE ORAL at 11:11

## 2022-05-31 ENCOUNTER — APPOINTMENT (OUTPATIENT)
Dept: ULTRASOUND IMAGING | Facility: HOSPITAL | Age: 37
End: 2022-05-31

## 2022-05-31 PROCEDURE — 59025 FETAL NON-STRESS TEST: CPT

## 2022-05-31 PROCEDURE — 76819 FETAL BIOPHYS PROFIL W/O NST: CPT

## 2022-05-31 PROCEDURE — 76819 FETAL BIOPHYS PROFIL W/O NST: CPT | Performed by: OBSTETRICS & GYNECOLOGY

## 2022-05-31 RX ADMIN — CETIRIZINE HYDROCHLORIDE 10 MG: 10 TABLET ORAL at 10:13

## 2022-05-31 RX ADMIN — Medication 10 ML: at 07:27

## 2022-05-31 RX ADMIN — SERTRALINE 100 MG: 100 TABLET, FILM COATED ORAL at 10:13

## 2022-05-31 RX ADMIN — PANTOPRAZOLE SODIUM 40 MG: 40 TABLET, DELAYED RELEASE ORAL at 09:42

## 2022-05-31 RX ADMIN — LEVOTHYROXINE SODIUM 88 MCG: 0.09 TABLET ORAL at 20:52

## 2022-05-31 RX ADMIN — Medication 1 TABLET: at 09:42

## 2022-05-31 RX ADMIN — Medication 10 ML: at 19:57

## 2022-06-01 PROCEDURE — 59025 FETAL NON-STRESS TEST: CPT

## 2022-06-01 PROCEDURE — 25010000002 HEPARIN (PORCINE) PER 1000 UNITS: Performed by: OBSTETRICS & GYNECOLOGY

## 2022-06-01 RX ORDER — HEPARIN SODIUM 5000 [USP'U]/ML
5000 INJECTION, SOLUTION INTRAVENOUS; SUBCUTANEOUS 2 TIMES DAILY
Status: DISCONTINUED | OUTPATIENT
Start: 2022-06-01 | End: 2022-06-04

## 2022-06-01 RX ADMIN — CETIRIZINE HYDROCHLORIDE 10 MG: 10 TABLET ORAL at 09:41

## 2022-06-01 RX ADMIN — Medication 10 ML: at 09:05

## 2022-06-01 RX ADMIN — PANTOPRAZOLE SODIUM 40 MG: 40 TABLET, DELAYED RELEASE ORAL at 11:13

## 2022-06-01 RX ADMIN — HEPARIN SODIUM 5000 UNITS: 5000 INJECTION INTRAVENOUS; SUBCUTANEOUS at 11:53

## 2022-06-01 RX ADMIN — Medication 1 TABLET: at 11:13

## 2022-06-01 RX ADMIN — ACETAMINOPHEN 650 MG: 325 TABLET, FILM COATED ORAL at 20:55

## 2022-06-01 RX ADMIN — LEVOTHYROXINE SODIUM 88 MCG: 0.09 TABLET ORAL at 20:57

## 2022-06-01 RX ADMIN — Medication 10 ML: at 20:56

## 2022-06-01 RX ADMIN — SERTRALINE 100 MG: 100 TABLET, FILM COATED ORAL at 09:41

## 2022-06-01 RX ADMIN — HEPARIN SODIUM 5000 UNITS: 5000 INJECTION INTRAVENOUS; SUBCUTANEOUS at 20:56

## 2022-06-01 NOTE — NON STRESS TEST
Anitra Nichols, a  at 30w6d with an MARCIAL of 2022, by Other Basis, was seen at [facility] LABOR DELIVERY for a nonstress test.    Chief Complaint   Patient presents with   • Leaking Fluid     Arrived to [facility] c/o Large gush of clear fluid while in bed at 2030; no PVB; +fetal movement; pt continues to leak fluid down her legs now; denies Ctx.  Hx per pt: denies GDM; denies GHTN; IVF preg with donor egg; no placental issues       Patient Active Problem List   Diagnosis   • Pyelonephritis affecting pregnancy   • Pyelonephritis affecting pregnancy in second trimester, antepartum   • Ureteral calculus, left   • Pregnancy   • Pregnancy resulting from in vitro fertilization in third trimester   •  premature rupture of membranes (PPROM) with onset of labor after 24 hours of rupture in third trimester, antepartum   • AMA (advanced maternal age) multigravida 35+       Start Time: 0908  Stop Time: 0940    Interpretation A  Nonstress Test Interpretation A: Reactive (22 1350 : Connie Tiwari, [place])           no

## 2022-06-01 NOTE — PAYOR COMM NOTE
[facility]   &  [facility] Gayle Delacruz  [address]    [address] [place] [address]    [place] [address]    Ann Donovan - [phone]  Utilization Review/Room Reservations  Phone: [phone], [phone], [phone] or [phone]  Fax: [phone]  Email: [email or web address]  Please call, fax back, or email with authorization or any questions! Thanks!      UPDATED CLINICAL  REF#[id number]        This fax contains any of the following:  Face Sheet, H&P, progress notes, consults, orders, meds, lab results, labor record, vitals, delivery worksheet, op note, d/c summary.  The information contained in this fax is confidential for the use of the Individual or entity named above. If the reader of this message is not the Intended recipient (or the employee or agent responsible to deliver it to the Intended recipient), you are hereby notified that any dissemination, distribution, or copy of this communication is prohibited. If you have received this communication in error, please notify us by collect telephone call and return the original message to us at the above address at our expense.  Anitra Nichols (37 y.o. Female)             Date of Birth   1985    Social Security Number       Address   [address] [place], [place] [address]    Home Phone   [phone]    N   [id number]       Jain   Denominational    Marital Status                               Admission Date   5/20/22    Admission Type   Emergency    Admitting Provider   Hodan More MD    Attending Provider   Hodan More MD    Department, Room/Bed   [facility] LABOR DELIVERY, L15/1       Discharge Date       Discharge Disposition       Discharge Destination                               Attending Provider: Hodan More MD    Allergies: Sulfa Antibiotics    Isolation: None   Infection: None   Code Status: CPR   Advance Care  "Planning Activity    Ht: 170.2 cm (67\")   Wt: 96.5 kg (212 lb 12.8 oz)    Admission Cmt: None   Principal Problem: None                Active Insurance as of 2022     Primary Coverage     Payor Plan Insurance Group Employer/Plan Group    [facility] [id number]     Payor Plan Address Payor Plan Phone Number Payor Plan Fax Number Effective Dates    [address]   2022 - None Entered    [facility] [id number]       Subscriber Name Subscriber Birth Date Member ID       CHAPO NATION 1985 [id number]                 Emergency Contacts      (Rel.) Home Phone Work Phone Mobile Phone    Berry [place] (Spouse) [phone] -- --               Physician Progress Notes (last 72 hours)      Samantha Navarrete APRN at 22 0920          [facility]  Obstetric Progress Note    Patient Name: Chapo Nation  :  1985  MRN:  [id number]  Hospital Day: 11  EGA: 30w6d      Subjective   Feels fine.  No c/o.  Did notice some pink w/ wiping this am- will obs.  On monitor now        Objective     VS:  Vital Signs Range for the last 24 hours  Temperature: Temp:  [97.8 °F (36.6 °C)-98.4 °F (36.9 °C)] 98.4 °F (36.9 °C)   Temp Source: Temp src: Oral   BP: BP: (100-118)/(48-73) 104/65   Pulse: Heart Rate:  [79-95] 90   Respirations: Resp:  [15-18] 18                   Physical Examination:     General :  Alert in NAD  Abdomen: Gravid, Fundus - NT       Lab results reviewed:  CBC:   Lab Results   Component Value Date    WBC 10.58 2022    HGB 12.4 2022    HCT 38.1 2022            A/P: 30w6d-- PPROM since 29wk.  Stable now.  Some pink w/ wiping this am.  Will obs.          Pregnancy    Pregnancy resulting from in vitro fertilization in third trimester     premature rupture of membranes (PPROM) with onset of labor after 24 hours of rupture in third trimester, antepartum    AMA (advanced maternal age) multigravida 35+              Samantha Navarrete, " MARIAELENA  2022  09:33 EDT      Electronically signed by Samantha Navarrete APRN at 22 0958     Hodan More MD at 22 1840          [facility]  Obstetric Progress Note    Patient Name: Anitra Nichols  :  1985  MRN:  [id number]  Hospital Day: 10  EGA: 30w5d      Subjective   Pt has no complaints. Had a large gush overnight but none since. No pink tinged discharge. No ctx. No pain. gfm      Objective     VS:  Vital Signs Range for the last 24 hours  Temperature: Temp:  [97.8 °F (36.6 °C)-98.5 °F (36.9 °C)] 97.8 °F (36.6 °C)   Temp Source: Temp src: Oral   BP: BP: ()/(48-76) 106/48   Pulse: Heart Rate:  [83-93] 90   Respirations: Resp:  [16-18] 18                   Physical Examination:     General :  Alert in NAD  Abdomen: Gravid, Fundus - NT       Lab results reviewed:  CBC:   Lab Results   Component Value Date    WBC 10.58 2022    HGB 12.4 2022    HCT 38.1 2022            A/P:      Pregnancy    Pregnancy resulting from in vitro fertilization in third trimester     premature rupture of membranes (PPROM) with onset of labor after 24 hours of rupture in third trimester, antepartum    AMA (advanced maternal age) multigravida 35+      Pt now [id number] with pprom since 29wks. Doing very well and plan to manage conservatively until 34wks. shaun today was 0. NST has been reassuring without variable decels. NO ctx and no signs of chorio.         Hodan More MD  2022  18:40 EDT      Electronically signed by Hodan More MD at 22     Agustín Krueger MD at 22 1318          [facility]  Obstetric Progress Note    Patient Name: Anitra Nichols  :  1985  MRN:  [id number]  Hospital Day: 9  EGA: 30w4d      Subjective  Feels well. Good FM. No contractions or cramping. Reports fluid was pink tinged last night but clear again today.         Objective     VS:  Vital Signs Range for the last 24 hours  Temperature: Temp:  [97.6  °F (36.4 °C)-98 °F (36.7 °C)] 97.8 °F (36.6 °C)   Temp Source: Temp src: Oral   BP: BP: ()/(47-76) 100/59   Pulse: Heart Rate:  [83-96] 96   Respirations: Resp:  [16] 16                   Physical Examination:     General :  Alert in NAD  Abdomen: Gravid, Fundus - NT      's moderate variability. Cat I.No contractions.           A/P: 1.  premature rupture of membranes (PPROM) with onset of labor after 24 hours of rupture in third trimester, antepartum - remains stable. No evidence of infection or labor. Continue in house observation until 34 weeks.           Pregnancy resulting from in vitro fertilization in third trimester     AMA (advanced maternal age) multigravida 35+              Agustín Krueger MD  2022  13:18 EDT        Electronically signed by Agustín Krueger MD at 22 1321     Jane Gaitan MD at 22 1322          [facility]  Obstetric Progress Note    Patient Name: Anitra Nichols  :  1985  MRN:  [id number]  Hospital Day: 8  EGA: 30w3d      Subjective   Still leaking. Good fm. No vb. No contns      Objective     VS:  Vital Signs Range for the last 24 hours  Temperature: Temp:  [97.7 °F (36.5 °C)-98.6 °F (37 °C)] 98.1 °F (36.7 °C)   Temp Source: Temp src: Oral   BP: BP: (103-119)/(54-67) 119/59   Pulse: Heart Rate:  [] 87   Respirations: Resp:  [16-18] 17                   Physical Examination:     General :  Alert in NAD  Abdomen: Gravid, Fundus - nontender       Lab results reviewed:  CBC:   Lab Results   Component Value Date    WBC 10.58 2022    HGB 12.4 2022    HCT 38.1 2022        nst- cat 1, reactive, good dusty. No decels    toco- rare contn    A/P:      Pregnancy      30.3 wks with PPROM -- in pt management until delivery. Still leaking but no si/sx infection or labor. D/w pt/fob expected plan of care if makes it to 34wks. D/w nicu sometimes on diversion.         Jane Gaitan MD  2022  13:23  EDT      Electronically signed by Jane Gaitan MD at 05/29/22 1325           Maternal Vitals (last day)     Date/Time Temp Pulse Resp BP SpO2 Weight    06/01/22 1120 98.1 (36.7) 89 16 104/46 -- --    06/01/22 0900 97.8 (36.6) 90 18 104/65 -- --    05/31/22 2302 98.4 (36.9) 79 15 100/64 -- --    05/31/22 1954 98.4 (36.9) 95 16 110/69 -- --    05/31/22 1520 97.8 (36.6) 90 18 106/48 -- --    05/31/22 1151 98.2 (36.8) 93 16 118/73 -- --    05/31/22 0725 97.9 (36.6) 83 18 97/52 -- --    05/31/22 0625 98.5 (36.9) 90 17 93/53 -- --          FHR (last day)     Date/Time Fetal HR Assessment Method Fetal HR (beats/min) Fetal HR Baseline Fetal HR Variability Fetal HR Accelerations Fetal HR Decelerations Fetal HR Tracing Category    05/31/22 2030 external 135 normal range moderate (amplitude range 6 to 25 bpm) greater than/equal to 15 bpm;lasting at least 15 seconds absent --    05/31/22 1948 external 145 normal range moderate (amplitude range 6 to 25 bpm) greater than/equal to 15 bpm;lasting at least 15 seconds absent --        Uterine Activity (last day)     Date/Time Method Contraction Frequency (Minutes) Contraction Duration (sec) Contraction Intensity Uterine Resting Tone Contraction Pattern    05/31/22 2030 external tocotransducer;palpation;per patient report -- -- no contractions soft by palpation --    05/31/22 1948 external tocotransducer;palpation;per patient report 0 -- no contractions soft by palpation --        Labor Pain (last day)     Date/Time (0-10) Pain Rating: Rest (0-10) Pain Rating: Activity Pain Management Interventions    05/31/22 1954 0 0 --

## 2022-06-01 NOTE — NON STRESS TEST
Anitra Nichols, a  at 30w6d with an MARCIAL of 2022, by Other Basis, was seen at [facility] LABOR DELIVERY for a nonstress test.    Chief Complaint   Patient presents with   • Leaking Fluid     Arrived to [facility] c/o Large gush of clear fluid while in bed at 2030; no PVB; +fetal movement; pt continues to leak fluid down her legs now; denies Ctx.  Hx per pt: denies GDM; denies GHTN; IVF preg with donor egg; no placental issues       Patient Active Problem List   Diagnosis   • Pyelonephritis affecting pregnancy   • Pyelonephritis affecting pregnancy in second trimester, antepartum   • Ureteral calculus, left   • Pregnancy   • Pregnancy resulting from in vitro fertilization in third trimester   •  premature rupture of membranes (PPROM) with onset of labor after 24 hours of rupture in third trimester, antepartum   • AMA (advanced maternal age) multigravida 35+       Start Time: 1325  Stop Time: 1350    Interpretation A  Nonstress Test Interpretation A: Reactive (22 1350 : Connie Tiwari, [place])

## 2022-06-01 NOTE — PLAN OF CARE
Problem: Adult Inpatient Plan of Care  Goal: Plan of Care Review  Outcome: Ongoing, Progressing  Flowsheets  Taken 6/1/2022 1903 by Connie Tiwari, RN  Outcome Evaluation: RNSTDOUGLASS, denies any pain or contractions. Patient has no complaints today. Will continue to monitor and further assess.  Taken 6/1/2022 0559 by Clare Garber RN  Progress: improving  Plan of Care Reviewed With: patient   Goal Outcome Evaluation:           Progress: no change  Outcome Evaluation: RNST VSS, denies any pain or contractions. Patient has no complaints today. Will continue to monitor and further assess.

## 2022-06-01 NOTE — PROGRESS NOTES
Adult Nutrition  Assessment/PES    Patient Name:  Anitra Nichols  YOB: 1985  MRN: [id number]  Admit Date:  5/20/2022    Assessment Date:  6/1/2022    Comments:  Nutrition assessment triggered by LOS x 11 days.  Admitted with PPROM.  Currently 30 weeks, 6 days pregnant.  IVF pregnancy with donor egg per chart review.  No contractions.  Conservative management until 34 weeks.    Eating well, % at meals.    RD to follow as needed.     Reason for Assessment     Row Name 06/01/22 1612          Reason for Assessment    Reason For Assessment per organizational policy  LOS     Diagnosis renal disease;pregnancy complications  pyelonephritis; adm with PPROM                Nutrition/Diet History     Row Name 06/01/22 1612          Nutrition/Diet History    Typical Intake (Food/Fluid/EN/PN) % at meals                  Labs/Tests/Procedures/Meds     Row Name 06/01/22 1613          Labs/Procedures/Meds    Lab Results Reviewed reviewed, pertinent            Diagnostic Tests/Procedures    Diagnostic Test/Procedure Reviewed reviewed, pertinent            Medications    Pertinent Medications Reviewed reviewed, pertinent     Pertinent Medications Comments heparin, synthroid, protonix, prenatal MVI                Physical Findings     Row Name 06/01/22 1613          Physical Findings    Overall Physical Appearance 30 weeks, 6 days pregnant; B=21, intact                  Nutrition Prescription Ordered     Row Name 06/01/22 1614          Nutrition Prescription PO    Current PO Diet Regular                Evaluation of Received Nutrient/Fluid Intake     Row Name 06/01/22 1614          PO Evaluation    Number of Meals 5     % PO Intake                      Problem/Interventions:   Problem 1     Row Name 06/01/22 1614          Nutrition Diagnoses Problem 1    Problem 1 Nutrition Appropriate for Condition at this Time     Etiology (related to) MNT for Treatment/Condition     Signs/Symptoms (evidenced  by) PO Intake;Report/Observation     Percent (%) intake recorded 85 %     Over number of meals 5                      Intervention Goal     Row Name 06/01/22 1614          Intervention Goal    General Maintain nutrition;Disease management/therapy;Meet nutritional needs for age/condition     PO Maintain intake     Weight Support appropriate growth                Nutrition Intervention     Row Name 06/01/22 1614          Nutrition Intervention    RD/Tech Action Follow Tx progress;Care plan reviewd                  Education/Evaluation     Row Name 06/01/22 1615          Education    Education Will Instruct as appropriate            Monitor/Evaluation    Monitor Per protocol;PO intake;Pertinent labs;Weight;Symptoms                 Electronically signed by:  Mar Petty RD  06/01/22 16:15 EDT

## 2022-06-01 NOTE — PROGRESS NOTES
[facility]  Obstetric Progress Note    Patient Name: Anitra Nichols  :  1985  MRN:  [id number]  Hospital Day: 11  EGA: 30w6d      Subjective   Feels fine.  No c/o.  Did notice some pink w/ wiping this am- will obs.  On monitor now        Objective     VS:  Vital Signs Range for the last 24 hours  Temperature: Temp:  [97.8 °F (36.6 °C)-98.4 °F (36.9 °C)] 98.4 °F (36.9 °C)   Temp Source: Temp src: Oral   BP: BP: (100-118)/(48-73) 104/65   Pulse: Heart Rate:  [79-95] 90   Respirations: Resp:  [15-18] 18                   Physical Examination:     General :  Alert in NAD  Abdomen: Gravid, Fundus - NT       Lab results reviewed:  CBC:   Lab Results   Component Value Date    WBC 10.58 2022    HGB 12.4 2022    HCT 38.1 2022            A/P: 30w6d-- PPROM since 29wk.  Stable now.  Some pink w/ wiping this am.  Will obs.          Pregnancy    Pregnancy resulting from in vitro fertilization in third trimester     premature rupture of membranes (PPROM) with onset of labor after 24 hours of rupture in third trimester, antepartum    AMA (advanced maternal age) multigravida 35+              Samantha Navarrete, APRN  2022  09:33 EDT

## 2022-06-01 NOTE — PLAN OF CARE
Goal Outcome Evaluation:  Plan of Care Reviewed With: patient        Progress: improving  Outcome Evaluation: RNST, VSS, denies ctx, slept throughout night, will continue to monitor

## 2022-06-02 LAB
ABO GROUP BLD: NORMAL
BASOPHILS # BLD AUTO: 0.03 10*3/MM3 (ref 0–0.2)
BASOPHILS NFR BLD AUTO: 0.3 % (ref 0–1.5)
BLD GP AB SCN SERPL QL: POSITIVE
DEPRECATED RDW RBC AUTO: 40.2 FL (ref 37–54)
EOSINOPHIL # BLD AUTO: 0.16 10*3/MM3 (ref 0–0.4)
EOSINOPHIL NFR BLD AUTO: 1.6 % (ref 0.3–6.2)
ERYTHROCYTE [DISTWIDTH] IN BLOOD BY AUTOMATED COUNT: 12.6 % (ref 12.3–15.4)
HCT VFR BLD AUTO: 35.9 % (ref 34–46.6)
HGB BLD-MCNC: 12.3 G/DL (ref 12–15.9)
IMM GRANULOCYTES # BLD AUTO: 0.08 10*3/MM3 (ref 0–0.05)
IMM GRANULOCYTES NFR BLD AUTO: 0.8 % (ref 0–0.5)
LYMPHOCYTES # BLD AUTO: 1.67 10*3/MM3 (ref 0.7–3.1)
LYMPHOCYTES NFR BLD AUTO: 16.5 % (ref 19.6–45.3)
MCH RBC QN AUTO: 30.3 PG (ref 26.6–33)
MCHC RBC AUTO-ENTMCNC: 34.3 G/DL (ref 31.5–35.7)
MCV RBC AUTO: 88.4 FL (ref 79–97)
MONOCYTES # BLD AUTO: 1.14 10*3/MM3 (ref 0.1–0.9)
MONOCYTES NFR BLD AUTO: 11.3 % (ref 5–12)
NEUTROPHILS NFR BLD AUTO: 69.5 % (ref 42.7–76)
NEUTROPHILS NFR BLD AUTO: 7.05 10*3/MM3 (ref 1.7–7)
NRBC BLD AUTO-RTO: 0 /100 WBC (ref 0–0.2)
PLATELET # BLD AUTO: 275 10*3/MM3 (ref 140–450)
PMV BLD AUTO: 9.9 FL (ref 6–12)
RBC # BLD AUTO: 4.06 10*6/MM3 (ref 3.77–5.28)
RESIDUAL RHIG DETECTED: NORMAL
RH BLD: NEGATIVE
T&S EXPIRATION DATE: NORMAL
T4 FREE SERPL-MCNC: 1.02 NG/DL (ref 0.93–1.7)
TSH SERPL DL<=0.05 MIU/L-ACNC: 2.18 UIU/ML (ref 0.27–4.2)
WBC NRBC COR # BLD: 10.13 10*3/MM3 (ref 3.4–10.8)

## 2022-06-02 PROCEDURE — 25010000002 HEPARIN (PORCINE) PER 1000 UNITS: Performed by: OBSTETRICS & GYNECOLOGY

## 2022-06-02 PROCEDURE — 86850 RBC ANTIBODY SCREEN: CPT | Performed by: OBSTETRICS & GYNECOLOGY

## 2022-06-02 PROCEDURE — 86901 BLOOD TYPING SEROLOGIC RH(D): CPT | Performed by: OBSTETRICS & GYNECOLOGY

## 2022-06-02 PROCEDURE — 86900 BLOOD TYPING SEROLOGIC ABO: CPT | Performed by: OBSTETRICS & GYNECOLOGY

## 2022-06-02 PROCEDURE — 86870 RBC ANTIBODY IDENTIFICATION: CPT | Performed by: OBSTETRICS & GYNECOLOGY

## 2022-06-02 PROCEDURE — 84443 ASSAY THYROID STIM HORMONE: CPT | Performed by: OBSTETRICS & GYNECOLOGY

## 2022-06-02 PROCEDURE — 84439 ASSAY OF FREE THYROXINE: CPT | Performed by: OBSTETRICS & GYNECOLOGY

## 2022-06-02 PROCEDURE — 59025 FETAL NON-STRESS TEST: CPT

## 2022-06-02 PROCEDURE — 85025 COMPLETE CBC W/AUTO DIFF WBC: CPT | Performed by: OBSTETRICS & GYNECOLOGY

## 2022-06-02 RX ORDER — DOCUSATE SODIUM 100 MG/1
100 CAPSULE, LIQUID FILLED ORAL DAILY
Status: DISCONTINUED | OUTPATIENT
Start: 2022-06-03 | End: 2022-06-04

## 2022-06-02 RX ORDER — DOCUSATE SODIUM 100 MG/1
100 CAPSULE, LIQUID FILLED ORAL 2 TIMES DAILY
Status: DISCONTINUED | OUTPATIENT
Start: 2022-06-02 | End: 2022-06-02

## 2022-06-02 RX ORDER — DIAPER,BRIEF,INFANT-TODD,DISP
1 EACH MISCELLANEOUS EVERY 12 HOURS SCHEDULED
Status: DISCONTINUED | OUTPATIENT
Start: 2022-06-02 | End: 2022-06-04

## 2022-06-02 RX ADMIN — HEPARIN SODIUM 5000 UNITS: 5000 INJECTION INTRAVENOUS; SUBCUTANEOUS at 20:31

## 2022-06-02 RX ADMIN — FAMOTIDINE 20 MG: 10 INJECTION INTRAVENOUS at 20:22

## 2022-06-02 RX ADMIN — Medication 1 TABLET: at 08:58

## 2022-06-02 RX ADMIN — SERTRALINE 100 MG: 100 TABLET, FILM COATED ORAL at 10:00

## 2022-06-02 RX ADMIN — Medication 10 ML: at 20:23

## 2022-06-02 RX ADMIN — DOCUSATE SODIUM 100 MG: 100 CAPSULE, LIQUID FILLED ORAL at 13:48

## 2022-06-02 RX ADMIN — HEPARIN SODIUM 5000 UNITS: 5000 INJECTION INTRAVENOUS; SUBCUTANEOUS at 08:58

## 2022-06-02 RX ADMIN — Medication 10 ML: at 09:04

## 2022-06-02 RX ADMIN — PANTOPRAZOLE SODIUM 40 MG: 40 TABLET, DELAYED RELEASE ORAL at 08:58

## 2022-06-02 RX ADMIN — Medication 10 ML: at 20:10

## 2022-06-02 RX ADMIN — HYDROCORTISONE 1 APPLICATION: 1 CREAM TOPICAL at 21:00

## 2022-06-02 RX ADMIN — CETIRIZINE HYDROCHLORIDE 10 MG: 10 TABLET ORAL at 10:00

## 2022-06-02 RX ADMIN — LEVOTHYROXINE SODIUM 88 MCG: 0.09 TABLET ORAL at 20:30

## 2022-06-02 RX ADMIN — HYDROCORTISONE 1 APPLICATION: 1 CREAM TOPICAL at 13:48

## 2022-06-02 NOTE — NON STRESS TEST
Anitra Nichols, a  at 30w6d with an MARCIAL of 2022, by Other Basis, was seen at [facility] LABOR DELIVERY for a nonstress test.    Chief Complaint   Patient presents with   • Leaking Fluid     Arrived to [facility] c/o Large gush of clear fluid while in bed at 2030; no PVB; +fetal movement; pt continues to leak fluid down her legs now; denies Ctx.  Hx per pt: denies GDM; denies GHTN; IVF preg with donor egg; no placental issues       Patient Active Problem List   Diagnosis   • Pyelonephritis affecting pregnancy   • Pyelonephritis affecting pregnancy in second trimester, antepartum   • Ureteral calculus, left   • Pregnancy   • Pregnancy resulting from in vitro fertilization in third trimester   •  premature rupture of membranes (PPROM) with onset of labor after 24 hours of rupture in third trimester, antepartum   • AMA (advanced maternal age) multigravida 35+       Start Time:   Stop Time:     Interpretation A  Nonstress Test Interpretation A: Reactive (22 : Honey Harman, RN)

## 2022-06-02 NOTE — NON STRESS TEST
Reason for test: Antepartum  Date of Test: 2022  Time frame of test:   RN NST Interpretation: Reactive    ryland Hutson  at 31w0d with an MARCIAL of 2022, by Other Basis, was seen at [facility] LABOR DELIVERY for a nonstress test.    Chief Complaint   Patient presents with   • Leaking Fluid     Arrived to [facility] c/o Large gush of clear fluid while in bed at 2030; no PVB; +fetal movement; pt continues to leak fluid down her legs now; denies Ctx.  Hx per pt: denies GDM; denies GHTN; IVF preg with donor egg; no placental issues       Patient Active Problem List   Diagnosis   • Pyelonephritis affecting pregnancy   • Pyelonephritis affecting pregnancy in second trimester, antepartum   • Ureteral calculus, left   • Pregnancy   • Pregnancy resulting from in vitro fertilization in third trimester   •  premature rupture of membranes (PPROM) with onset of labor after 24 hours of rupture in third trimester, antepartum   • AMA (advanced maternal age) multigravida 35+         Interpretation A  Nonstress Test Interpretation A: Reactive (22 : Bekah Ortega, RN)  Comments A:  (22 : Bekah Ortega, RN)

## 2022-06-02 NOTE — NURSING NOTE
Pt called RN to room. Pt shows RN noel pads with pink fluid discharge. Pt denies cramping/CTXs. +FM. Will place on EFM for pt reassurance.

## 2022-06-02 NOTE — PROGRESS NOTES
[facility]  Obstetric Progress Note    Patient Name: Anitra Nichols  :  1985  MRN:  [id number]  Hospital Day: 12  EGA: 31w0d      Subjective   Doing well.  No further pink d/c.  No cramping.  Reports good FM.  Some trouble w/ constipation        Objective     VS:  Vital Signs Range for the last 24 hours  Temperature: Temp:  [98.2 °F (36.8 °C)-98.7 °F (37.1 °C)] 98.7 °F (37.1 °C)   Temp Source: Temp src: Oral   BP: BP: ()/(53-70) 110/70   Pulse: Heart Rate:  [76-87] 81   Respirations: Resp:  [16-18] 16                   Physical Examination:     General :  Alert in NAD  Abdomen: Gravid, Fundus - NT       Lab results reviewed:  CBC:   Lab Results   Component Value Date    WBC 10.13 2022    HGB 12.3 2022    HCT 35.9 2022            A/P: 31 week-- PPROM since 29wk.  Stable.  No evidence infection.   Constipation-- will add colace          Pregnancy    Pregnancy resulting from in vitro fertilization in third trimester     premature rupture of membranes (PPROM) with onset of labor after 24 hours of rupture in third trimester, antepartum    AMA (advanced maternal age) multigravida 35+              MARIAELENA Lara  2022  11:23 EDT    Patient seen and examined. Agree with above assessment.   Lupe Brown MD  2022  13:59 EDT

## 2022-06-02 NOTE — PLAN OF CARE
Goal Outcome Evaluation:  Plan of Care Reviewed With: patient        Progress: improving  Outcome Evaluation: VSS. RNST. denies pain or contractions.  Slept well throughout night. No complaints from patient.

## 2022-06-02 NOTE — PLAN OF CARE
Goal Outcome Evaluation:  Plan of Care Reviewed With: patient           Outcome Evaluation: RNST. +FM. Denies VB, CTXs, Continues ot leak small amounts of clear fluid. Medications as ordered.

## 2022-06-03 ENCOUNTER — APPOINTMENT (OUTPATIENT)
Dept: ULTRASOUND IMAGING | Facility: HOSPITAL | Age: 37
End: 2022-06-03

## 2022-06-03 PROCEDURE — 25010000002 HEPARIN (PORCINE) PER 1000 UNITS: Performed by: OBSTETRICS & GYNECOLOGY

## 2022-06-03 PROCEDURE — 76819 FETAL BIOPHYS PROFIL W/O NST: CPT

## 2022-06-03 PROCEDURE — 76819 FETAL BIOPHYS PROFIL W/O NST: CPT | Performed by: OBSTETRICS & GYNECOLOGY

## 2022-06-03 PROCEDURE — 59025 FETAL NON-STRESS TEST: CPT

## 2022-06-03 RX ADMIN — ACETAMINOPHEN 650 MG: 325 TABLET, FILM COATED ORAL at 18:59

## 2022-06-03 RX ADMIN — LEVOTHYROXINE SODIUM 88 MCG: 0.09 TABLET ORAL at 21:38

## 2022-06-03 RX ADMIN — Medication 10 ML: at 08:07

## 2022-06-03 RX ADMIN — Medication 1 TABLET: at 08:03

## 2022-06-03 RX ADMIN — PANTOPRAZOLE SODIUM 40 MG: 40 TABLET, DELAYED RELEASE ORAL at 08:02

## 2022-06-03 RX ADMIN — HYDROCORTISONE 1 APPLICATION: 1 CREAM TOPICAL at 08:03

## 2022-06-03 RX ADMIN — SERTRALINE 100 MG: 100 TABLET, FILM COATED ORAL at 08:02

## 2022-06-03 RX ADMIN — FAMOTIDINE 20 MG: 10 INJECTION INTRAVENOUS at 12:49

## 2022-06-03 RX ADMIN — DOCUSATE SODIUM 100 MG: 100 CAPSULE, LIQUID FILLED ORAL at 08:03

## 2022-06-03 RX ADMIN — CETIRIZINE HYDROCHLORIDE 10 MG: 10 TABLET ORAL at 08:02

## 2022-06-03 RX ADMIN — Medication 10 ML: at 21:38

## 2022-06-03 RX ADMIN — HEPARIN SODIUM 5000 UNITS: 5000 INJECTION INTRAVENOUS; SUBCUTANEOUS at 21:41

## 2022-06-03 RX ADMIN — HEPARIN SODIUM 5000 UNITS: 5000 INJECTION INTRAVENOUS; SUBCUTANEOUS at 08:03

## 2022-06-03 RX ADMIN — HYDROCORTISONE 1 APPLICATION: 1 CREAM TOPICAL at 21:43

## 2022-06-03 NOTE — PROGRESS NOTES
[facility]  Obstetric Progress Note    Patient Name: Anitra Nichols  :  1985  MRN:  [id number]  Hospital Day: 13  EGA: 31w1d      Subjective   Feels well.  No c/o.  Did have some red on pad last night but nothing since.  Had US this am and baby is now breech!        Objective     VS:  Vital Signs Range for the last 24 hours  Temperature: Temp:  [98.2 °F (36.8 °C)-98.5 °F (36.9 °C)] 98.2 °F (36.8 °C)   Temp Source: Temp src: Oral   BP: BP: ()/(62-69) 97/62   Pulse: Heart Rate:  [85-92] 85   Respirations: Resp:  [18] 18                   Physical Examination:     General :  Alert in NAD  Abdomen: Gravid, Fundus - NT  NST cat I       Lab results reviewed:  CBC:   Lab Results   Component Value Date    WBC 10.13 2022    HGB 12.3 2022    HCT 35.9 2022            A/P: 31w1d PPROM since 29 weeks BREECH today on US (no report yet-- pt reported)   Had more pink/ red on pad last  Night but nothing since.  Will obs.           Pregnancy    Pregnancy resulting from in vitro fertilization in third trimester     premature rupture of membranes (PPROM) with onset of labor after 24 hours of rupture in third trimester, antepartum    AMA (advanced maternal age) multigravida 35+              Samantha Navarrete, APRN  6/3/2022  10:30 EDT

## 2022-06-03 NOTE — NON STRESS TEST
Anitra Nichols, ryland  at 31w1d with an MARCIAL of 2022, by Other Basis, was seen at [facility] LABOR DELIVERY for a nonstress test.    Chief Complaint   Patient presents with   • Leaking Fluid     Arrived to [facility] c/o Large gush of clear fluid while in bed at 2030; no PVB; +fetal movement; pt continues to leak fluid down her legs now; denies Ctx.  Hx per pt: denies GDM; denies GHTN; IVF preg with donor egg; no placental issues       Patient Active Problem List   Diagnosis   • Pyelonephritis affecting pregnancy   • Pyelonephritis affecting pregnancy in second trimester, antepartum   • Ureteral calculus, left   • Pregnancy   • Pregnancy resulting from in vitro fertilization in third trimester   •  premature rupture of membranes (PPROM) with onset of labor after 24 hours of rupture in third trimester, antepartum   • AMA (advanced maternal age) multigravida 35+       Start Time: 954  Stop Time: 1021    Interpretation A  Nonstress Test Interpretation A: Reactive (22 1021 : Connie Tiwari RN)

## 2022-06-03 NOTE — PLAN OF CARE
Problem: Adult Inpatient Plan of Care  Goal: Plan of Care Review  Outcome: Ongoing, Progressing  Goal: Patient-Specific Goal (Individualized)  Outcome: Ongoing, Progressing  Goal: Absence of Hospital-Acquired Illness or Injury  Outcome: Ongoing, Progressing  Intervention: Identify and Manage Fall Risk  Intervention: Prevent and Manage VTE (Venous Thromboembolism) Risk  Goal: Optimal Comfort and Wellbeing  Outcome: Ongoing, Progressing  Intervention: Provide Person-Centered Care  Goal: Readiness for Transition of Care  Outcome: Ongoing, Progressing     Problem: Skin Injury Risk Increased  Goal: Skin Health and Integrity  Outcome: Ongoing, Progressing     Problem:  Labor  Goal: Delayed  Delivery  Outcome: Ongoing, Progressing  Intervention: Monitor and Manage  Labor   Goal Outcome Evaluation:           Progress: improving  Outcome Evaluation: RNST, +FM, Pink bloody show on pad previous shift, provider aware. This shift show is washed out by amniotic fluid on pad. Denies ctx pain. Pt continues to be in good spirits as friend visited yesterday and supplied pt with many gifts.

## 2022-06-03 NOTE — NON STRESS TEST
Anitra Nichols, a  at 31w1d with an MARCIAL of 2022, by Other Basis, was seen at [facility] LABOR DELIVERY for a nonstress test.    Chief Complaint   Patient presents with   • Leaking Fluid     Arrived to [facility] c/o Large gush of clear fluid while in bed at 2030; no PVB; +fetal movement; pt continues to leak fluid down her legs now; denies Ctx.  Hx per pt: denies GDM; denies GHTN; IVF preg with donor egg; no placental issues       Patient Active Problem List   Diagnosis   • Pyelonephritis affecting pregnancy   • Pyelonephritis affecting pregnancy in second trimester, antepartum   • Ureteral calculus, left   • Pregnancy   • Pregnancy resulting from in vitro fertilization in third trimester   •  premature rupture of membranes (PPROM) with onset of labor after 24 hours of rupture in third trimester, antepartum   • AMA (advanced maternal age) multigravida 35+       Start Time:   Stop Time:     NST Roman Brady RN

## 2022-06-04 ENCOUNTER — ANESTHESIA EVENT (OUTPATIENT)
Dept: LABOR AND DELIVERY | Facility: HOSPITAL | Age: 37
End: 2022-06-04

## 2022-06-04 ENCOUNTER — ANESTHESIA (OUTPATIENT)
Dept: LABOR AND DELIVERY | Facility: HOSPITAL | Age: 37
End: 2022-06-04

## 2022-06-04 LAB
ABO GROUP BLD: NORMAL
ABO GROUP BLD: NORMAL
APTT PPP: 27.4 SECONDS (ref 22.7–35.4)
ATMOSPHERIC PRESS: 747.1 MMHG
BASE EXCESS BLDCOV CALC-SCNC: -1.4 MMOL/L (ref -30–30)
BASOPHILS # BLD AUTO: 0.04 10*3/MM3 (ref 0–0.2)
BASOPHILS NFR BLD AUTO: 0.4 % (ref 0–1.5)
BDY SITE: ABNORMAL
BLD GP AB SCN SERPL QL: POSITIVE
COLLECT TME SMN: ABNORMAL
DEPRECATED RDW RBC AUTO: 42.2 FL (ref 37–54)
EOSINOPHIL # BLD AUTO: 0.19 10*3/MM3 (ref 0–0.4)
EOSINOPHIL NFR BLD AUTO: 1.8 % (ref 0.3–6.2)
ERYTHROCYTE [DISTWIDTH] IN BLOOD BY AUTOMATED COUNT: 12.8 % (ref 12.3–15.4)
FETAL BLEED: NEGATIVE
FIBRINOGEN PPP-MCNC: 731 MG/DL (ref 219–464)
HBV SURFACE AG SERPL QL IA: NORMAL
HCO3 BLDCOV-SCNC: 23.3 MMOL/L
HCT VFR BLD AUTO: 37.5 % (ref 34–46.6)
HCV AB SER DONR QL: NORMAL
HGB BLD-MCNC: 12.8 G/DL (ref 12–15.9)
HIV1+2 AB SER QL: NORMAL
IMM GRANULOCYTES # BLD AUTO: 0.08 10*3/MM3 (ref 0–0.05)
IMM GRANULOCYTES NFR BLD AUTO: 0.7 % (ref 0–0.5)
INHALED O2 CONCENTRATION: 21 %
INR PPP: 1.02 (ref 0.9–1.1)
LYMPHOCYTES # BLD AUTO: 1.57 10*3/MM3 (ref 0.7–3.1)
LYMPHOCYTES NFR BLD AUTO: 14.6 % (ref 19.6–45.3)
MCH RBC QN AUTO: 30.8 PG (ref 26.6–33)
MCHC RBC AUTO-ENTMCNC: 34.1 G/DL (ref 31.5–35.7)
MCV RBC AUTO: 90.1 FL (ref 79–97)
MODALITY: ABNORMAL
MONOCYTES # BLD AUTO: 1.13 10*3/MM3 (ref 0.1–0.9)
MONOCYTES NFR BLD AUTO: 10.5 % (ref 5–12)
NEUTROPHILS NFR BLD AUTO: 7.78 10*3/MM3 (ref 1.7–7)
NEUTROPHILS NFR BLD AUTO: 72 % (ref 42.7–76)
NOTE: ABNORMAL
NRBC BLD AUTO-RTO: 0 /100 WBC (ref 0–0.2)
NUMBER OF DOSES: NORMAL
PCO2 BLDCOV: 38.3 MM HG (ref 35–51.3)
PH BLDCOV: 7.39 PH UNITS (ref 7.26–7.4)
PLATELET # BLD AUTO: 270 10*3/MM3 (ref 140–450)
PMV BLD AUTO: 9.8 FL (ref 6–12)
PO2 BLDCOV: 20.7 MM HG (ref 19–39)
PROTHROMBIN TIME: 13.3 SECONDS (ref 11.7–14.2)
RBC # BLD AUTO: 4.16 10*6/MM3 (ref 3.77–5.28)
RESIDUAL RHIG DETECTED: NORMAL
RH BLD: NEGATIVE
RH BLD: NEGATIVE
SAO2 % BLDCOA: 33.8 % (ref 92–99)
SAO2 % BLDCOV: ABNORMAL %
T&S EXPIRATION DATE: NORMAL
WBC NRBC COR # BLD: 10.79 10*3/MM3 (ref 3.4–10.8)

## 2022-06-04 PROCEDURE — 88307 TISSUE EXAM BY PATHOLOGIST: CPT

## 2022-06-04 PROCEDURE — 85461 HEMOGLOBIN FETAL: CPT | Performed by: OBSTETRICS & GYNECOLOGY

## 2022-06-04 PROCEDURE — 82803 BLOOD GASES ANY COMBINATION: CPT

## 2022-06-04 PROCEDURE — 85384 FIBRINOGEN ACTIVITY: CPT | Performed by: OBSTETRICS & GYNECOLOGY

## 2022-06-04 PROCEDURE — 86870 RBC ANTIBODY IDENTIFICATION: CPT | Performed by: OBSTETRICS & GYNECOLOGY

## 2022-06-04 PROCEDURE — 85025 COMPLETE CBC W/AUTO DIFF WBC: CPT | Performed by: OBSTETRICS & GYNECOLOGY

## 2022-06-04 PROCEDURE — 25010000002 HYDROMORPHONE PER 4 MG: Performed by: NURSE ANESTHETIST, CERTIFIED REGISTERED

## 2022-06-04 PROCEDURE — 25010000002 ONDANSETRON PER 1 MG: Performed by: OBSTETRICS & GYNECOLOGY

## 2022-06-04 PROCEDURE — 86901 BLOOD TYPING SEROLOGIC RH(D): CPT | Performed by: OBSTETRICS & GYNECOLOGY

## 2022-06-04 PROCEDURE — 25010000002 DIPHENHYDRAMINE PER 50 MG: Performed by: NURSE ANESTHETIST, CERTIFIED REGISTERED

## 2022-06-04 PROCEDURE — 86900 BLOOD TYPING SEROLOGIC ABO: CPT | Performed by: OBSTETRICS & GYNECOLOGY

## 2022-06-04 PROCEDURE — 87340 HEPATITIS B SURFACE AG IA: CPT | Performed by: OBSTETRICS & GYNECOLOGY

## 2022-06-04 PROCEDURE — 25010000002 AZITHROMYCIN PER 500 MG: Performed by: OBSTETRICS & GYNECOLOGY

## 2022-06-04 PROCEDURE — 85730 THROMBOPLASTIN TIME PARTIAL: CPT | Performed by: OBSTETRICS & GYNECOLOGY

## 2022-06-04 PROCEDURE — 25010000002 ONDANSETRON PER 1 MG: Performed by: ANESTHESIOLOGY

## 2022-06-04 PROCEDURE — G0432 EIA HIV-1/HIV-2 SCREEN: HCPCS | Performed by: OBSTETRICS & GYNECOLOGY

## 2022-06-04 PROCEDURE — 86803 HEPATITIS C AB TEST: CPT | Performed by: OBSTETRICS & GYNECOLOGY

## 2022-06-04 PROCEDURE — 25010000002 CEFAZOLIN IN DEXTROSE 2-4 GM/100ML-% SOLUTION: Performed by: OBSTETRICS & GYNECOLOGY

## 2022-06-04 PROCEDURE — 0 MAGNESIUM SULFATE 20 GM/500ML SOLUTION: Performed by: OBSTETRICS & GYNECOLOGY

## 2022-06-04 PROCEDURE — 25010000002 PHENYLEPHRINE 10 MG/ML SOLUTION: Performed by: NURSE ANESTHETIST, CERTIFIED REGISTERED

## 2022-06-04 PROCEDURE — 85610 PROTHROMBIN TIME: CPT | Performed by: OBSTETRICS & GYNECOLOGY

## 2022-06-04 PROCEDURE — 25010000002 MORPHINE PER 10 MG: Performed by: ANESTHESIOLOGY

## 2022-06-04 PROCEDURE — 86850 RBC ANTIBODY SCREEN: CPT | Performed by: OBSTETRICS & GYNECOLOGY

## 2022-06-04 RX ORDER — OXYTOCIN/0.9 % SODIUM CHLORIDE 30/500 ML
125 PLASTIC BAG, INJECTION (ML) INTRAVENOUS CONTINUOUS PRN
Status: COMPLETED | OUTPATIENT
Start: 2022-06-04 | End: 2022-06-04

## 2022-06-04 RX ORDER — PHENYLEPHRINE HYDROCHLORIDE 10 MG/ML
INJECTION INTRAVENOUS AS NEEDED
Status: DISCONTINUED | OUTPATIENT
Start: 2022-06-04 | End: 2022-06-04 | Stop reason: SURG

## 2022-06-04 RX ORDER — ACETAMINOPHEN 325 MG/1
650 TABLET ORAL EVERY 4 HOURS PRN
Status: ACTIVE | OUTPATIENT
Start: 2022-06-04 | End: 2022-06-05

## 2022-06-04 RX ORDER — NYSTATIN 100000 U/G
1 OINTMENT TOPICAL EVERY 12 HOURS SCHEDULED
Status: DISCONTINUED | OUTPATIENT
Start: 2022-06-04 | End: 2022-06-04

## 2022-06-04 RX ORDER — SODIUM CHLORIDE, SODIUM LACTATE, POTASSIUM CHLORIDE, CALCIUM CHLORIDE 600; 310; 30; 20 MG/100ML; MG/100ML; MG/100ML; MG/100ML
INJECTION, SOLUTION INTRAVENOUS CONTINUOUS PRN
Status: DISCONTINUED | OUTPATIENT
Start: 2022-06-04 | End: 2022-06-04 | Stop reason: SURG

## 2022-06-04 RX ORDER — CEFAZOLIN SODIUM 2 G/100ML
2 INJECTION, SOLUTION INTRAVENOUS ONCE
Status: COMPLETED | OUTPATIENT
Start: 2022-06-04 | End: 2022-06-04

## 2022-06-04 RX ORDER — MAGNESIUM SULFATE HEPTAHYDRATE 40 MG/ML
4 INJECTION, SOLUTION INTRAVENOUS CONTINUOUS
Status: DISCONTINUED | OUTPATIENT
Start: 2022-06-04 | End: 2022-06-04

## 2022-06-04 RX ORDER — HYDROCODONE BITARTRATE AND ACETAMINOPHEN 5; 325 MG/1; MG/1
1 TABLET ORAL EVERY 4 HOURS PRN
Status: DISPENSED | OUTPATIENT
Start: 2022-06-04 | End: 2022-06-05

## 2022-06-04 RX ORDER — OXYTOCIN/0.9 % SODIUM CHLORIDE 30/500 ML
999 PLASTIC BAG, INJECTION (ML) INTRAVENOUS ONCE
Status: COMPLETED | OUTPATIENT
Start: 2022-06-04 | End: 2022-06-04

## 2022-06-04 RX ORDER — IBUPROFEN 600 MG/1
600 TABLET ORAL EVERY 8 HOURS PRN
Status: DISCONTINUED | OUTPATIENT
Start: 2022-06-04 | End: 2022-06-08 | Stop reason: HOSPADM

## 2022-06-04 RX ORDER — ONDANSETRON 4 MG/1
4 TABLET, FILM COATED ORAL EVERY 8 HOURS PRN
Status: DISCONTINUED | OUTPATIENT
Start: 2022-06-04 | End: 2022-06-08 | Stop reason: HOSPADM

## 2022-06-04 RX ORDER — MAGNESIUM SULFATE HEPTAHYDRATE 40 MG/ML
4 INJECTION, SOLUTION INTRAVENOUS ONCE
Status: DISCONTINUED | OUTPATIENT
Start: 2022-06-04 | End: 2022-06-04

## 2022-06-04 RX ORDER — NAPROXEN 500 MG/1
250 TABLET ORAL EVERY 12 HOURS PRN
Status: ACTIVE | OUTPATIENT
Start: 2022-06-04 | End: 2022-06-05

## 2022-06-04 RX ORDER — MORPHINE SULFATE 1 MG/ML
INJECTION, SOLUTION EPIDURAL; INTRATHECAL; INTRAVENOUS
Status: COMPLETED | OUTPATIENT
Start: 2022-06-04 | End: 2022-06-04

## 2022-06-04 RX ORDER — DIPHENHYDRAMINE HCL 25 MG
25 CAPSULE ORAL EVERY 4 HOURS PRN
Status: DISCONTINUED | OUTPATIENT
Start: 2022-06-04 | End: 2022-06-08 | Stop reason: HOSPADM

## 2022-06-04 RX ORDER — EPHEDRINE SULFATE 50 MG/ML
INJECTION, SOLUTION INTRAVENOUS AS NEEDED
Status: DISCONTINUED | OUTPATIENT
Start: 2022-06-04 | End: 2022-06-04 | Stop reason: SURG

## 2022-06-04 RX ORDER — SIMETHICONE 80 MG
80 TABLET,CHEWABLE ORAL 4 TIMES DAILY PRN
Status: DISCONTINUED | OUTPATIENT
Start: 2022-06-04 | End: 2022-06-08 | Stop reason: HOSPADM

## 2022-06-04 RX ORDER — OXYTOCIN/0.9 % SODIUM CHLORIDE 30/500 ML
125 PLASTIC BAG, INJECTION (ML) INTRAVENOUS CONTINUOUS PRN
Status: DISCONTINUED | OUTPATIENT
Start: 2022-06-04 | End: 2022-06-08 | Stop reason: HOSPADM

## 2022-06-04 RX ORDER — DIPHENHYDRAMINE HYDROCHLORIDE 50 MG/ML
25 INJECTION INTRAMUSCULAR; INTRAVENOUS EVERY 4 HOURS PRN
Status: DISCONTINUED | OUTPATIENT
Start: 2022-06-04 | End: 2022-06-08 | Stop reason: HOSPADM

## 2022-06-04 RX ORDER — OXYTOCIN/0.9 % SODIUM CHLORIDE 30/500 ML
250 PLASTIC BAG, INJECTION (ML) INTRAVENOUS ONCE
Status: DISCONTINUED | OUTPATIENT
Start: 2022-06-04 | End: 2022-06-04

## 2022-06-04 RX ORDER — BUPIVACAINE HYDROCHLORIDE 7.5 MG/ML
INJECTION, SOLUTION EPIDURAL; RETROBULBAR
Status: COMPLETED | OUTPATIENT
Start: 2022-06-04 | End: 2022-06-04

## 2022-06-04 RX ORDER — OXYCODONE HYDROCHLORIDE AND ACETAMINOPHEN 5; 325 MG/1; MG/1
1 TABLET ORAL EVERY 4 HOURS PRN
Status: DISCONTINUED | OUTPATIENT
Start: 2022-06-04 | End: 2022-06-08 | Stop reason: HOSPADM

## 2022-06-04 RX ORDER — ERYTHROMYCIN 5 MG/G
OINTMENT OPHTHALMIC
Status: ACTIVE
Start: 2022-06-04 | End: 2022-06-05

## 2022-06-04 RX ORDER — ONDANSETRON 2 MG/ML
4 INJECTION INTRAMUSCULAR; INTRAVENOUS ONCE AS NEEDED
Status: COMPLETED | OUTPATIENT
Start: 2022-06-04 | End: 2022-06-04

## 2022-06-04 RX ORDER — MORPHINE SULFATE 1 MG/ML
INJECTION, SOLUTION EPIDURAL; INTRATHECAL; INTRAVENOUS AS NEEDED
Status: DISCONTINUED | OUTPATIENT
Start: 2022-06-04 | End: 2022-06-04 | Stop reason: SURG

## 2022-06-04 RX ORDER — FAMOTIDINE 10 MG/ML
20 INJECTION, SOLUTION INTRAVENOUS ONCE AS NEEDED
Status: DISCONTINUED | OUTPATIENT
Start: 2022-06-04 | End: 2022-06-04 | Stop reason: HOSPADM

## 2022-06-04 RX ORDER — ONDANSETRON 2 MG/ML
4 INJECTION INTRAMUSCULAR; INTRAVENOUS ONCE AS NEEDED
Status: ACTIVE | OUTPATIENT
Start: 2022-06-04 | End: 2022-06-05

## 2022-06-04 RX ORDER — PHYTONADIONE 1 MG/.5ML
INJECTION, EMULSION INTRAMUSCULAR; INTRAVENOUS; SUBCUTANEOUS
Status: ACTIVE
Start: 2022-06-04 | End: 2022-06-05

## 2022-06-04 RX ORDER — ACETAMINOPHEN 500 MG
1000 TABLET ORAL ONCE
Status: DISCONTINUED | OUTPATIENT
Start: 2022-06-04 | End: 2022-06-04 | Stop reason: HOSPADM

## 2022-06-04 RX ORDER — HYDROMORPHONE HYDROCHLORIDE 1 MG/ML
0.5 INJECTION, SOLUTION INTRAMUSCULAR; INTRAVENOUS; SUBCUTANEOUS
Status: DISPENSED | OUTPATIENT
Start: 2022-06-04 | End: 2022-06-05

## 2022-06-04 RX ORDER — ONDANSETRON 2 MG/ML
4 INJECTION INTRAMUSCULAR; INTRAVENOUS EVERY 6 HOURS PRN
Status: DISCONTINUED | OUTPATIENT
Start: 2022-06-04 | End: 2022-06-04

## 2022-06-04 RX ORDER — ACETAMINOPHEN 500 MG
1000 TABLET ORAL ONCE
Status: COMPLETED | OUTPATIENT
Start: 2022-06-04 | End: 2022-06-04

## 2022-06-04 RX ADMIN — PHENYLEPHRINE HYDROCHLORIDE 100 MCG: 10 INJECTION, SOLUTION INTRAVENOUS at 14:43

## 2022-06-04 RX ADMIN — IBUPROFEN 600 MG: 600 TABLET ORAL at 23:23

## 2022-06-04 RX ADMIN — MORPHINE SULFATE 150 MCG: 1 INJECTION, SOLUTION EPIDURAL; INTRATHECAL; INTRAVENOUS at 14:31

## 2022-06-04 RX ADMIN — CEFAZOLIN SODIUM 2 G: 2 INJECTION, SOLUTION INTRAVENOUS at 13:45

## 2022-06-04 RX ADMIN — PHENYLEPHRINE HYDROCHLORIDE 100 MCG: 10 INJECTION, SOLUTION INTRAVENOUS at 14:35

## 2022-06-04 RX ADMIN — Medication 125 ML/HR: at 16:29

## 2022-06-04 RX ADMIN — ACETAMINOPHEN 1000 MG: 500 TABLET ORAL at 13:44

## 2022-06-04 RX ADMIN — SERTRALINE 100 MG: 100 TABLET, FILM COATED ORAL at 11:08

## 2022-06-04 RX ADMIN — MAGNESIUM SULFATE IN WATER 4 G: 40 INJECTION, SOLUTION INTRAVENOUS at 13:28

## 2022-06-04 RX ADMIN — HYDROCORTISONE 1 APPLICATION: 1 CREAM TOPICAL at 11:07

## 2022-06-04 RX ADMIN — DIPHENHYDRAMINE HYDROCHLORIDE 25 MG: 50 INJECTION, SOLUTION INTRAMUSCULAR; INTRAVENOUS at 20:47

## 2022-06-04 RX ADMIN — ONDANSETRON 4 MG: 2 INJECTION INTRAMUSCULAR; INTRAVENOUS at 16:41

## 2022-06-04 RX ADMIN — SODIUM CHLORIDE, POTASSIUM CHLORIDE, SODIUM LACTATE AND CALCIUM CHLORIDE 1000 ML: 600; 310; 30; 20 INJECTION, SOLUTION INTRAVENOUS at 13:29

## 2022-06-04 RX ADMIN — Medication 999 ML/HR: at 14:47

## 2022-06-04 RX ADMIN — FAMOTIDINE 20 MG: 10 INJECTION INTRAVENOUS at 13:45

## 2022-06-04 RX ADMIN — BUPIVACAINE HYDROCHLORIDE 1.6 ML: 7.5 INJECTION, SOLUTION EPIDURAL; RETROBULBAR at 14:31

## 2022-06-04 RX ADMIN — PANTOPRAZOLE SODIUM 40 MG: 40 TABLET, DELAYED RELEASE ORAL at 11:27

## 2022-06-04 RX ADMIN — PHENYLEPHRINE HYDROCHLORIDE 100 MCG: 10 INJECTION, SOLUTION INTRAVENOUS at 14:37

## 2022-06-04 RX ADMIN — CETIRIZINE HYDROCHLORIDE 10 MG: 10 TABLET ORAL at 11:29

## 2022-06-04 RX ADMIN — PHENYLEPHRINE HYDROCHLORIDE 100 MCG: 10 INJECTION, SOLUTION INTRAVENOUS at 14:33

## 2022-06-04 RX ADMIN — HYDROMORPHONE HYDROCHLORIDE 0.5 MG: 1 INJECTION, SOLUTION INTRAMUSCULAR; INTRAVENOUS; SUBCUTANEOUS at 16:40

## 2022-06-04 RX ADMIN — ONDANSETRON 4 MG: 2 INJECTION INTRAMUSCULAR; INTRAVENOUS at 13:45

## 2022-06-04 RX ADMIN — EPHEDRINE SULFATE 5 MG: 50 INJECTION INTRAVENOUS at 14:53

## 2022-06-04 RX ADMIN — PHENYLEPHRINE HYDROCHLORIDE 200 MCG: 10 INJECTION, SOLUTION INTRAVENOUS at 14:40

## 2022-06-04 RX ADMIN — HYDROMORPHONE HYDROCHLORIDE 0.5 MG: 1 INJECTION, SOLUTION INTRAMUSCULAR; INTRAVENOUS; SUBCUTANEOUS at 17:55

## 2022-06-04 RX ADMIN — AZITHROMYCIN MONOHYDRATE 500 MG: 500 INJECTION, POWDER, LYOPHILIZED, FOR SOLUTION INTRAVENOUS at 14:10

## 2022-06-04 RX ADMIN — HYDROMORPHONE HYDROCHLORIDE 0.5 MG: 1 INJECTION, SOLUTION INTRAMUSCULAR; INTRAVENOUS; SUBCUTANEOUS at 20:30

## 2022-06-04 RX ADMIN — PHENYLEPHRINE HYDROCHLORIDE 200 MCG: 10 INJECTION, SOLUTION INTRAVENOUS at 14:49

## 2022-06-04 RX ADMIN — MORPHINE SULFATE 2 MG: 1 INJECTION, SOLUTION EPIDURAL; INTRATHECAL; INTRAVENOUS at 15:26

## 2022-06-04 RX ADMIN — DOCUSATE SODIUM 100 MG: 100 CAPSULE, LIQUID FILLED ORAL at 11:27

## 2022-06-04 RX ADMIN — SODIUM CHLORIDE, POTASSIUM CHLORIDE, SODIUM LACTATE AND CALCIUM CHLORIDE: 600; 310; 30; 20 INJECTION, SOLUTION INTRAVENOUS at 14:24

## 2022-06-04 RX ADMIN — HYDROCODONE BITARTRATE AND ACETAMINOPHEN 1 TABLET: 5; 325 TABLET ORAL at 23:23

## 2022-06-04 RX ADMIN — Medication 10 ML: at 11:28

## 2022-06-04 NOTE — PROGRESS NOTES
[facility]  Obstetric Progress Note    Patient Name: Anitra Nichols  :  1985  MRN:  [id number]  Hospital Day: 14  EGA: 31w2d      Subjective         Objective     VS:  Vital Signs Range for the last 24 hours  Temperature: Temp:  [97.8 °F (36.6 °C)-98.5 °F (36.9 °C)] 98 °F (36.7 °C)   Temp Source: Temp src: Oral   BP: BP: (104-117)/(56-72) 104/56   Pulse: Heart Rate:  [80-92] 80   Respirations: Resp:  [16-18] 18                   Physical Examination:     General :  Alert in NAD  Abdomen: Gravid, Fundus -        Lab results reviewed:  CBC:   Lab Results   Component Value Date    WBC 10.13 2022    HGB 12.3 2022    HCT 35.9 2022            A/P:      Pregnancy    Pregnancy resulting from in vitro fertilization in third trimester     premature rupture of membranes (PPROM) with onset of labor after 24 hours of rupture in third trimester, antepartum    AMA (advanced maternal age) multigravida 35+      31w2 PPROM- minimal vaginal leaking. Positive fetal movement. No abd pain. Has not had NST today.     Has a rash on hip where underwear ends- d/w to wear underwear from home at ours is likely an irritant. Will apply nystatin cream and can take benadryl prn    GBS vag culture ordered        Lissa Carvajal, APRN  2022  09:53 EDT

## 2022-06-04 NOTE — ANESTHESIA PROCEDURE NOTES
Spinal Block      Patient reassessed immediately prior to procedure    Patient location during procedure: OR  Start Time: 6/4/2022 2:27 PM  Stop Time: 6/4/2022 2:31 PM  Indication:post-op pain management and procedure for pain  Performed By  Anesthesiologist: Berry Xiong MD  Preanesthetic Checklist  Completed: patient identified, IV checked, site marked, risks and benefits discussed, surgical consent, monitors and equipment checked, pre-op evaluation and timeout performed  Spinal Block Prep:  Patient Position:sitting  Sterile Tech:cap, gloves, mask and sterile barriers  Prep:Chloraprep  Patient Monitoring:blood pressure monitoring, continuous pulse oximetry and EKG  Spinal Block Procedure  Approach:midline  Guidance:palpation technique  Location:L4-L5  Needle Type:Sacha  Needle Gauge:25 G  Placement of Spinal needle event:cerebrospinal fluid aspirated  Paresthesia: no  Fluid Appearance:clear  Medications: Morphine PF injection, 150 mcg  bupivacaine PF (MARCAINE) 0.75 % injection, 1.6 mL  Med Administered at 6/4/2022 2:31 PM   Post Assessment  Patient Tolerance:patient tolerated the procedure well with no apparent complications  Complications no

## 2022-06-04 NOTE — H&P
[facility]  Obstetric Preop History and Physical:    Patient Name: Anitra Nation  :  1985  MRN:  [id number]      Chief Complaint   Patient presents with   • Leaking Fluid     Arrived to [facility] c/o Large gush of clear fluid while in bed at 2030; no PVB; +fetal movement; pt continues to leak fluid down her legs now; denies Ctx.  Hx per pt: denies GDM; denies GHTN; IVF preg with donor egg; no placental issues       Subjective                37 y.o. female  currently at 31w2d PPROM and now mod amount of bleeding with suspect early abruption/         Past OB History:       OB History    Para Term  AB Living   6 1 1 0 4 1   SAB IAB Ectopic Molar Multiple Live Births   1 0 0 0 0 1      # Outcome Date GA Lbr Anjum/2nd Weight Sex Delivery Anes PTL Lv   6 Current            5 Term 10/29/16 38w3d 17:07 / 01:41 3258 g (7 lb 2.9 oz) M Vag-Spont EPI N PAT      Name: PRATEEK NATION      Apgar1: 8  Apgar5: 9   4 SAB 11/18/15           3 AB            2 AB            1 AB                Past Medical History: Past Medical History:   Diagnosis Date   • Abnormal Pap smear of cervix     normal follow up   • Anxiety    • Depression    • Disease of thyroid gland    • Ureteral calculus, left 3/15/2019   • Urinary tract infection       Past Surgical History Past Surgical History:   Procedure Laterality Date   • ADENOIDECTOMY     • CERVICAL BIOPSY  W/ LOOP ELECTRODE EXCISION     • CYSTOSCOPY URETEROSCOPY LASER LITHOTRIPSY Left 3/15/2019    Procedure: CYSTOTCOPY, bilateral retrogrades,  LASER LITHOTRIPSY, STONE BASKET EXTRACTION AND STENT PLACEMENT;  Surgeon: Wilbur Bains MD;  Location: [facility];  Service: Urology   • EAR TUBES     • TONSILLECTOMY     • WISDOM TOOTH EXTRACTION        Family History: Family History   Problem Relation Age of Onset   • Hypertension Brother       Social History:  reports that she has quit smoking. She has never used smokeless tobacco.   reports no history of  alcohol use.   reports no history of drug use.    Allergies:     Sulfa antibiotics     Objective       Vital Signs Range for the last 24 hours  Temperature: Temp:  [97.8 °F (36.6 °C)-98.5 °F (36.9 °C)] 98 °F (36.7 °C)   Temp Source: Temp src: Oral   BP: BP: (104-120)/(56-72) 120/63   Pulse: Heart Rate:  [80-95] 95   Respirations: Resp:  [16-18] 18   SPO2: SpO2:  [97 %-100 %] 97 %                          Physical Exam:  General: Alert in NAD   Heart Normal rate and regular rhythm   Lungs Clear to auscultation bilaterally     Abdomen Gravid, soft, no masses   Extremities mod edema         Cervix: Exam by: Method: sterile exam per RN   Dilation: Cervical Dilation (cm): 0-1   Effacement: Cervical Effacement: 70%   Station: Fetal Station: -5                             FHR:   TOCO: 130's reactive   No contractions         Assessment & Plan     Assessment: IUP @ 31 2/7 with mode amount of bleeding that is not slowing down since event this am.     Plan: Recommend proceeding with  section for delivery. Indications for proceeding, risks and benefits have been discussed with patient and . All questions answered.         Lupe Brown MD  2022  13:12 EDT

## 2022-06-04 NOTE — PROGRESS NOTES
Spoke with M Dr More for phone consultation of plan of care- reviewed pt's h/o and physical findings. Dr More rec to do coagulation studies and follow them q 4-8 hrs based on findings and if fibrinogen less than 300 or bleeding becomes more to go ahead and deliver.

## 2022-06-04 NOTE — NON STRESS TEST
Anitra Nichols, a  at 31w1d with an MARCIAL of 2022, by Other Basis, was seen at [facility] ANTEPARTUM UNIT for a nonstress test.    Chief Complaint   Patient presents with   • Leaking Fluid     Arrived to [facility] c/o Large gush of clear fluid while in bed at 2030; no PVB; +fetal movement; pt continues to leak fluid down her legs now; denies Ctx.  Hx per pt: denies GDM; denies GHTN; IVF preg with donor egg; no placental issues       Patient Active Problem List   Diagnosis   • Pyelonephritis affecting pregnancy   • Pyelonephritis affecting pregnancy in second trimester, antepartum   • Ureteral calculus, left   • Pregnancy   • Pregnancy resulting from in vitro fertilization in third trimester   •  premature rupture of membranes (PPROM) with onset of labor after 24 hours of rupture in third trimester, antepartum   • AMA (advanced maternal age) multigravida 35+       Start Time:   Stop Time:     Interpretation A  Nonstress Test Interpretation A: Reactive (22 : Silvia Candelario, RN)

## 2022-06-04 NOTE — PROGRESS NOTES
"Called by RN due to moderate vaginal bleeding episode.     /56 (BP Location: Left arm, Patient Position: Lying)   Pulse 80   Temp 98 °F (36.7 °C) (Oral)   Resp 18   Ht 170.2 cm (67\")   Wt 96.5 kg (212 lb 12.8 oz)   SpO2 97%   BMI 33.33 kg/m²     FHT's 130's reactive no decels/  [place] no contractions    Limited bedside u/s- scant fluid and breech presentation/ NT uterus.    SSE- scant blood at vaginal canal- cvx unchaged. Ft/50%.    Will keep pt NPO/ type and screen and cbc. Hold off heparin and if another moderate vaginal bleeding episode will proceed with CD and will give mg for neuroprotection.  R/b/a given.   "

## 2022-06-04 NOTE — ANESTHESIA POSTPROCEDURE EVALUATION
"Patient: Anitra Nichols    Procedure Summary     Date: 22 Room / Location:  [facility]   [facility]    Anesthesia Start: 1424 Anesthesia Stop:     Procedure:  SECTION PRIMARY (N/A Abdomen) Diagnosis: (PPROM)    Surgeons: Lupe Brown MD Provider: Iris Jones MD    Anesthesia Type: spinal ASA Status: 2 - Emergent          Anesthesia Type: spinal    Vitals  Vitals Value Taken Time   /54 22 1645   Temp 36.6 °C (97.9 °F) 22 1545   Pulse 98 22 1645   Resp 16 22 1630   SpO2 94 % 22 1645   Vitals shown include unvalidated device data.        Post Anesthesia Care and Evaluation    Patient location during evaluation: bedside  Patient participation: complete - patient participated  Level of consciousness: awake  Pain management: adequate  Airway patency: patent  Anesthetic complications: No anesthetic complications  PONV Status: controlled  Cardiovascular status: acceptable  Respiratory status: acceptable  Hydration status: acceptable    Comments: /61   Pulse 104   Temp 36.6 °C (97.9 °F) (Oral)   Resp 16   Ht 170.2 cm (67\")   Wt 96.5 kg (212 lb 12.8 oz)   SpO2 95%   Breastfeeding Yes   BMI 33.33 kg/m²         "

## 2022-06-04 NOTE — L&D DELIVERY NOTE
[facility]   Section Operative Note    Pre-Operative Dx:   1.  IUP at 31w2d weeks PPROM   2. Abruption         Postoperative Dx:  Same        Procedure: Classical HARRIS   Surgeon: Lupe Brown     Assistant: Dr Infante   Anesthesia: Spinal                  Findings:                           Amniotic Fluid clear  Uterus normal  Tubes and ovaries normal bilaterally              Infant:           Gender: Viable male  infant     Weight: 2090 g (4 lb 9.7 oz)     Apgars:   @ 1 minute /       @ 5 minutes                 Indication for C/Section:     Abruption              Procedure Details:  The patient was taken to the operating room where spinal anesthesia was found to be adequate. She was prepped and draped in the usual sterile manner in the dorsal supine position with leftward tilt. A Pfannenstiel incision was made and carried down to the fascia. The fascia was incised in the mid-line and extended transversely with Colin scissors. The fascia was grasped and elevated and  from the underlying rectus muscles superiorly and inferiorly. The peritoneum was identified and entered. Peritoneal incision was extended superiorly and inferiorly with good visualization of the bladder. The bladder blade was inserted and the vesicouterine peritoneum was incised transversely and the bladder flap was created digitally. The bladder blade was reinserted.     There was no  lower uterine segment developed. So the uterus was cut in vertical fashion.    The feet where elevated and delivered through the incision. The remainder of the infant was delivered without difficulty.     The umbilical cord was clamped and cut and the infant was handed off the field. Cord ph and cord bloods were obtained. The placenta was removed intact and appeared normal.     The uterine incision was closed with running locked sutures of 0 monocryl in 3 layers.  Serosal tissue was re approximated with 3-0 monocryl.     The abdominal cavity was  irrigated and cleared of all clot and debris. The uterine incision was inspected and noted to be hemostatic. Rectus muscles were reapproximated in the midline with 0 chromic.  The fascia was closed with 0 PDS in running continuous fashion. The subcutaneous tissue was closed with 3-0 chromic. The skin was closed in subcuticular fashion with 4-0 Monocryl.   Instrument, sponge, and needle counts were correct prior the abdominal closure and at the conclusion of the case. Mother and baby  to recovery room in stable condition.              Complications:     None          Antibiotics: cefazolin (Ancef)/ Azythromycin                      Lupe Brown MD  6/4/2022  15:30 EDT

## 2022-06-04 NOTE — ANESTHESIA PREPROCEDURE EVALUATION
Anesthesia Evaluation     Patient summary reviewed and Nursing notes reviewed   no history of anesthetic complications:  NPO Solid Status: > 8 hours  NPO Liquid Status: > 8 hours           Airway   Mallampati: II  TM distance: >3 FB  Neck ROM: full  No difficulty expected  Dental - normal exam     Pulmonary    (-) rhonchi, decreased breath sounds, wheezes, not a smoker  Cardiovascular   Exercise tolerance: good (4-7 METS)    Rhythm: regular  Rate: normal    (-) hypertension, CAD, angina, AUGUSTE, murmur      Neuro/Psych  (-) CVA  GI/Hepatic/Renal/Endo    (+)   thyroid problem hypothyroidism  (-) no renal disease, diabetes    Musculoskeletal     Abdominal     Abdomen: soft.   Substance History      OB/GYN    (+) Pregnant,     Comment: Previous vaginal delivery withouut complications under epidural analgesia  Pt has an abruption with present pregnancy      Other        ROS/Med Hx Other: Labs reviewed hgb 12.8   plt 270  Fibr >700  inr 1.02                Anesthesia Plan    ASA 2 - emergent     spinal   (31w2d  2 PIV placed )  intravenous induction     Anesthetic plan, all risks, benefits, and alternatives have been provided, discussed and informed consent has been obtained with: patient.        CODE STATUS:    Level Of Support Discussed With: Patient  Code Status (Patient has no pulse and is not breathing): CPR (Attempt to Resuscitate)  Medical Interventions (Patient has pulse or is breathing): Full Support

## 2022-06-04 NOTE — PLAN OF CARE
Problem: Adult Inpatient Plan of Care  Goal: Plan of Care Review  Outcome: Ongoing, Progressing  Flowsheets  Taken 6/4/2022 0554 by iSlvia Candelario, RN  Progress: improving  Outcome Evaluation: Reactive NST, reports +FM and clear fluid. Patient denies contractions/cramping/pain and VB. Vital signs stable.  Taken 6/2/2022 1834 by Bekah Ortega RN  Plan of Care Reviewed With: patient     Problem: Adult Inpatient Plan of Care  Goal: Patient-Specific Goal (Individualized)  Outcome: Ongoing, Progressing  Flowsheets (Taken 6/4/2022 0554)  Patient-Specific Goals (Include Timeframe): Stay pregnant until 34 weeks, healthy mom and baby.  Individualized Care Needs: denies  Anxieties, Fears or Concerns: PTL   Goal Outcome Evaluation:           Progress: improving  Outcome Evaluation: Reactive NST, reports +FM and clear fluid. Patient denies contractions/cramping/pain and VB. Vital signs stable.

## 2022-06-05 LAB
BASOPHILS # BLD AUTO: 0.03 10*3/MM3 (ref 0–0.2)
BASOPHILS NFR BLD AUTO: 0.3 % (ref 0–1.5)
DEPRECATED RDW RBC AUTO: 43 FL (ref 37–54)
EOSINOPHIL # BLD AUTO: 0.15 10*3/MM3 (ref 0–0.4)
EOSINOPHIL NFR BLD AUTO: 1.4 % (ref 0.3–6.2)
ERYTHROCYTE [DISTWIDTH] IN BLOOD BY AUTOMATED COUNT: 12.9 % (ref 12.3–15.4)
HCT VFR BLD AUTO: 34 % (ref 34–46.6)
HGB BLD-MCNC: 11.1 G/DL (ref 12–15.9)
IMM GRANULOCYTES # BLD AUTO: 0.06 10*3/MM3 (ref 0–0.05)
IMM GRANULOCYTES NFR BLD AUTO: 0.6 % (ref 0–0.5)
LYMPHOCYTES # BLD AUTO: 1.66 10*3/MM3 (ref 0.7–3.1)
LYMPHOCYTES NFR BLD AUTO: 15.9 % (ref 19.6–45.3)
MCH RBC QN AUTO: 30 PG (ref 26.6–33)
MCHC RBC AUTO-ENTMCNC: 32.6 G/DL (ref 31.5–35.7)
MCV RBC AUTO: 91.9 FL (ref 79–97)
MONOCYTES # BLD AUTO: 1.07 10*3/MM3 (ref 0.1–0.9)
MONOCYTES NFR BLD AUTO: 10.2 % (ref 5–12)
NEUTROPHILS NFR BLD AUTO: 7.49 10*3/MM3 (ref 1.7–7)
NEUTROPHILS NFR BLD AUTO: 71.6 % (ref 42.7–76)
NRBC BLD AUTO-RTO: 0 /100 WBC (ref 0–0.2)
PLATELET # BLD AUTO: 225 10*3/MM3 (ref 140–450)
PMV BLD AUTO: 9.4 FL (ref 6–12)
RBC # BLD AUTO: 3.7 10*6/MM3 (ref 3.77–5.28)
WBC NRBC COR # BLD: 10.46 10*3/MM3 (ref 3.4–10.8)

## 2022-06-05 PROCEDURE — 85025 COMPLETE CBC W/AUTO DIFF WBC: CPT | Performed by: OBSTETRICS & GYNECOLOGY

## 2022-06-05 PROCEDURE — 25010000002 RHO D IMMUNE GLOBULIN 1500 UNIT/2ML SOLUTION PREFILLED SYRINGE: Performed by: OBSTETRICS & GYNECOLOGY

## 2022-06-05 PROCEDURE — 25010000002 DIPHENHYDRAMINE PER 50 MG: Performed by: NURSE ANESTHETIST, CERTIFIED REGISTERED

## 2022-06-05 RX ORDER — LEVOTHYROXINE SODIUM 0.07 MG/1
75 TABLET ORAL
Status: DISCONTINUED | OUTPATIENT
Start: 2022-06-05 | End: 2022-06-05

## 2022-06-05 RX ORDER — FAMOTIDINE 10 MG/ML
20 INJECTION, SOLUTION INTRAVENOUS ONCE
Status: DISCONTINUED | OUTPATIENT
Start: 2022-06-05 | End: 2022-06-08 | Stop reason: HOSPADM

## 2022-06-05 RX ORDER — LEVOTHYROXINE SODIUM 88 UG/1
88 TABLET ORAL NIGHTLY
Status: DISCONTINUED | OUTPATIENT
Start: 2022-06-05 | End: 2022-06-08 | Stop reason: HOSPADM

## 2022-06-05 RX ORDER — LIDOCAINE HYDROCHLORIDE 10 MG/ML
0.5 INJECTION, SOLUTION EPIDURAL; INFILTRATION; INTRACAUDAL; PERINEURAL ONCE AS NEEDED
Status: DISCONTINUED | OUTPATIENT
Start: 2022-06-05 | End: 2022-06-08 | Stop reason: HOSPADM

## 2022-06-05 RX ORDER — ACETAMINOPHEN 500 MG
1000 TABLET ORAL ONCE
Status: DISCONTINUED | OUTPATIENT
Start: 2022-06-05 | End: 2022-06-08 | Stop reason: HOSPADM

## 2022-06-05 RX ORDER — CETIRIZINE HYDROCHLORIDE 10 MG/1
10 TABLET ORAL DAILY
Status: DISCONTINUED | OUTPATIENT
Start: 2022-06-05 | End: 2022-06-08 | Stop reason: HOSPADM

## 2022-06-05 RX ORDER — PRENATAL VIT/IRON FUM/FOLIC AC 27MG-0.8MG
1 TABLET ORAL DAILY
Status: DISCONTINUED | OUTPATIENT
Start: 2022-06-05 | End: 2022-06-08 | Stop reason: HOSPADM

## 2022-06-05 RX ORDER — SERTRALINE HYDROCHLORIDE 100 MG/1
100 TABLET, FILM COATED ORAL DAILY
Status: DISCONTINUED | OUTPATIENT
Start: 2022-06-05 | End: 2022-06-08 | Stop reason: HOSPADM

## 2022-06-05 RX ORDER — SODIUM CHLORIDE, SODIUM LACTATE, POTASSIUM CHLORIDE, CALCIUM CHLORIDE 600; 310; 30; 20 MG/100ML; MG/100ML; MG/100ML; MG/100ML
9 INJECTION, SOLUTION INTRAVENOUS CONTINUOUS
Status: DISCONTINUED | OUTPATIENT
Start: 2022-06-05 | End: 2022-06-08 | Stop reason: HOSPADM

## 2022-06-05 RX ORDER — FENTANYL CITRATE 50 UG/ML
50 INJECTION, SOLUTION INTRAMUSCULAR; INTRAVENOUS
Status: DISCONTINUED | OUTPATIENT
Start: 2022-06-05 | End: 2022-06-08 | Stop reason: HOSPADM

## 2022-06-05 RX ORDER — SODIUM CHLORIDE 0.9 % (FLUSH) 0.9 %
3 SYRINGE (ML) INJECTION EVERY 12 HOURS SCHEDULED
Status: DISCONTINUED | OUTPATIENT
Start: 2022-06-05 | End: 2022-06-08 | Stop reason: HOSPADM

## 2022-06-05 RX ORDER — PANTOPRAZOLE SODIUM 40 MG/1
40 TABLET, DELAYED RELEASE ORAL
Status: DISCONTINUED | OUTPATIENT
Start: 2022-06-05 | End: 2022-06-08 | Stop reason: HOSPADM

## 2022-06-05 RX ORDER — DOCUSATE SODIUM 100 MG/1
100 CAPSULE, LIQUID FILLED ORAL 2 TIMES DAILY
Status: DISCONTINUED | OUTPATIENT
Start: 2022-06-05 | End: 2022-06-08 | Stop reason: HOSPADM

## 2022-06-05 RX ORDER — DIAPER,BRIEF,INFANT-TODD,DISP
1 EACH MISCELLANEOUS EVERY 12 HOURS SCHEDULED
Status: DISCONTINUED | OUTPATIENT
Start: 2022-06-05 | End: 2022-06-08 | Stop reason: HOSPADM

## 2022-06-05 RX ORDER — SODIUM CHLORIDE 0.9 % (FLUSH) 0.9 %
3-10 SYRINGE (ML) INJECTION AS NEEDED
Status: DISCONTINUED | OUTPATIENT
Start: 2022-06-05 | End: 2022-06-08 | Stop reason: HOSPADM

## 2022-06-05 RX ADMIN — CETIRIZINE HYDROCHLORIDE 10 MG: 10 TABLET ORAL at 17:26

## 2022-06-05 RX ADMIN — IBUPROFEN 600 MG: 600 TABLET ORAL at 17:26

## 2022-06-05 RX ADMIN — OXYCODONE AND ACETAMINOPHEN 1 TABLET: 5; 325 TABLET ORAL at 05:38

## 2022-06-05 RX ADMIN — HUMAN RHO(D) IMMUNE GLOBULIN 1500 UNITS: 1500 SOLUTION INTRAMUSCULAR; INTRAVENOUS at 11:02

## 2022-06-05 RX ADMIN — OXYCODONE AND ACETAMINOPHEN 1 TABLET: 5; 325 TABLET ORAL at 15:09

## 2022-06-05 RX ADMIN — PANTOPRAZOLE SODIUM 40 MG: 40 TABLET, DELAYED RELEASE ORAL at 10:57

## 2022-06-05 RX ADMIN — OXYCODONE AND ACETAMINOPHEN 1 TABLET: 5; 325 TABLET ORAL at 10:57

## 2022-06-05 RX ADMIN — DOCUSATE SODIUM 100 MG: 100 CAPSULE, LIQUID FILLED ORAL at 19:52

## 2022-06-05 RX ADMIN — LEVOTHYROXINE SODIUM 88 MCG: 0.09 TABLET ORAL at 22:45

## 2022-06-05 RX ADMIN — DIPHENHYDRAMINE HYDROCHLORIDE 25 MG: 50 INJECTION, SOLUTION INTRAMUSCULAR; INTRAVENOUS at 04:38

## 2022-06-05 RX ADMIN — SERTRALINE 100 MG: 100 TABLET, FILM COATED ORAL at 17:26

## 2022-06-05 RX ADMIN — Medication 1 TABLET: at 10:57

## 2022-06-05 RX ADMIN — OXYCODONE AND ACETAMINOPHEN 1 TABLET: 5; 325 TABLET ORAL at 19:52

## 2022-06-05 RX ADMIN — IBUPROFEN 600 MG: 600 TABLET ORAL at 08:44

## 2022-06-05 NOTE — LACTATION NOTE
This note was copied from a baby's chart.  Set up HGP and discussed use/cleaning/sterilizing. Recommended double pumping every 3 hours for 15 min. Discussed colostrum expectations and when to expect mature milk supply. Pump needed, advised how to obtain through [facility]. Advised mother to call as needed.

## 2022-06-05 NOTE — PROGRESS NOTES
[facility]   PROGRESS NOTE    Patient Name: Anitra Nichols  :  1985  MRN:  [id number]      Post-Op Day 1 S/P    Delivered a male infant.  Subjective     Patient reports:    Pain is well controlled. Voiding and ambulating without difficulty. Tolerating po. Lochia normal.       The patient plans to breastfeed.         Objective       Vitals: Vital Signs Range for the last 24 hours  Temperature: Temp:  [97 °F (36.1 °C)-98 °F (36.7 °C)] 97.2 °F (36.2 °C)   Temp Source: Temp src: Oral   BP: BP: ()/(53-84) 102/67   Pulse: Heart Rate:  [] 76   Respirations: Resp:  [16-18] 17         Intake/Output Summary (Last 24 hours) at 2022 1011  Last data filed at 2022 0500  Gross per 24 hour   Intake 1358 ml   Output 2204 ml   Net -846 ml                                              Physical Exam     General Alert and awake, in NAD      CV Regular rate and rhythm      Lungs clear to auscultation bilaterally        Abdomen Soft, non-distended, fundus firm,  2 below umbilicus, appropriately tender      Incision  Dressing clean, dry and intact.      Extremities  tr edema, calves NT               LABS: Results from last 7 days   Lab Units 22  0925 22  1305 22  0948   WBC 10*3/mm3 10.46 10.79 10.13   HEMOGLOBIN g/dL 11.1* 12.8 12.3   HEMATOCRIT % 34.0 37.5 35.9   PLATELETS 10*3/mm3 225 270 275         Prenatal labs results reviewed:  Yes   Rubella:  immune  Rh Status:    RH type   Date Value Ref Range Status   2022 Negative  Final     Comment:     RhIG IS Indicated. Baby is Rh Positive                       Assessment & Plan   : 1. POD 1 S/P C/S: Hemodynamically stable.  Doing well.  Continue routine care. Baby boy in NICU.  Will resume regular meds and let shower!      Pregnancy    Pregnancy resulting from in vitro fertilization in third trimester     premature rupture of membranes (PPROM) with onset of labor after 24 hours of rupture in third trimester,  antepartum    AMA (advanced maternal age) multigravida 35+          Christen Horne MD  6/5/2022  10:11 EDT

## 2022-06-05 NOTE — NURSING NOTE
Pt called out with tears as she had a mod amount of blood on her pad which occurred between her going to the bathroom around 0815 this morning, and there was just scant fluid, and around 0945 when she went to the bathroom again. There is about a whiteboard eraser amount of bright red blood on her pad. After consulting with Dr. Brown, she desires for pt to be sent down to LD. LD CN called, and team will come to bring her down.    This note is being put in on 6/5/22, but events occurred around 0945 on 6/4/22

## 2022-06-06 RX ADMIN — OXYCODONE AND ACETAMINOPHEN 1 TABLET: 5; 325 TABLET ORAL at 11:54

## 2022-06-06 RX ADMIN — CETIRIZINE HYDROCHLORIDE 10 MG: 10 TABLET ORAL at 08:06

## 2022-06-06 RX ADMIN — OXYCODONE AND ACETAMINOPHEN 1 TABLET: 5; 325 TABLET ORAL at 07:54

## 2022-06-06 RX ADMIN — IBUPROFEN 600 MG: 600 TABLET ORAL at 19:57

## 2022-06-06 RX ADMIN — OXYCODONE AND ACETAMINOPHEN 1 TABLET: 5; 325 TABLET ORAL at 02:29

## 2022-06-06 RX ADMIN — SERTRALINE 100 MG: 100 TABLET, FILM COATED ORAL at 08:06

## 2022-06-06 RX ADMIN — IBUPROFEN 600 MG: 600 TABLET ORAL at 10:56

## 2022-06-06 RX ADMIN — OXYCODONE AND ACETAMINOPHEN 1 TABLET: 5; 325 TABLET ORAL at 20:27

## 2022-06-06 RX ADMIN — IBUPROFEN 600 MG: 600 TABLET ORAL at 02:28

## 2022-06-06 RX ADMIN — Medication 1 TABLET: at 08:06

## 2022-06-06 RX ADMIN — OXYCODONE AND ACETAMINOPHEN 1 TABLET: 5; 325 TABLET ORAL at 16:19

## 2022-06-06 RX ADMIN — DOCUSATE SODIUM 100 MG: 100 CAPSULE, LIQUID FILLED ORAL at 08:06

## 2022-06-06 RX ADMIN — DOCUSATE SODIUM 100 MG: 100 CAPSULE, LIQUID FILLED ORAL at 19:57

## 2022-06-06 NOTE — PROGRESS NOTES
[facility]   PROGRESS NOTE    Patient Name: Anitra Nichols  :  1985  MRN:  [id number]      Post-Op 2 S/P   Subjective     Patient reports:   Doing well. Struggling some w/ pain control. Tolerating regular diet and having flatus.    Lochia normal.       Objective       Vitals: Vital Signs Range for the last 24 hours  Temperature: Temp:  [97 °F (36.1 °C)-97.9 °F (36.6 °C)] 97.9 °F (36.6 °C)       BP: BP: ()/(61-75) 109/61   Pulse: Heart Rate:  [75-86] 75   Respirations: Resp:  [16-17] 16                                         Physical Exam     General Alert       Abdomen Soft, non-distended, fundus firm, 1 below umbilicus, appropriately tender      Incision  Intact, no erythema or exudate      Extremities Calves NT bilaterally                Assessment & Plan  :   POD 2  -  Doing well.  Continue usual cares.     A little teary eyed today-- overwhelmed with events/ pain. Baby doing well in NICU          Pregnancy    Pregnancy resulting from in vitro fertilization in third trimester     premature rupture of membranes (PPROM) with onset of labor after 24 hours of rupture in third trimester, antepartum    AMA (advanced maternal age) multigravida 35+               MARIAELENA Lara  2022  10:07 EDT

## 2022-06-06 NOTE — PLAN OF CARE
Goal Outcome Evaluation:      Postpartum day 2 from C/S. Showered today, walking in halls and taking po pain meds as needed with good relief. Visits baby in NICU frequently. Pumping breast milk.  supportive at bedside.

## 2022-06-06 NOTE — PROGRESS NOTES
Discharge Planning Assessment  [facility]     Patient Name: Anitra Nichols  MRN: [id number]  Today's Date: 6/6/2022    Admit Date: 5/20/2022     Discharge Needs Assessment    No documentation.                Discharge Plan     Row Name 06/06/22 1141       Plan    Plan Infant to discharge home with mother when medically stable. Ana VARGAS CSW    Plan Comments Mother: Chantal Nichols MRN: [id number]; Infant: Luda Nichols MRN: [id number]; CSW was not consulted but infant was admitted to the NICU. Neither mother nor infant had a UDS conducted at admission nor was infant’s cord sent for toxicology screening due to there being no indicators of substance use. CSW met with mother at infant’s bedside to conduct consult. Infant’s father was also present and mother gave permission to CSW for CSW to speak with her while he was present. Mother verified her home address, phone number and insurance provider. Mother plans to add infant to her insurance plan. Mother was not enrolled in WIC during pregnancy. Mother’s support system consists of infant’s father, siblings and in-laws. Mother also has a 4 y/o son and he is currently with his/infant’s maternal uncle. Mother plans to take infant to see a pediatrician at [facility] when infant is medically stable for discharge. Mother also confirmed that she is comfortable scheduling infant’s appointments and that she has transportation to them as well. Mother verified that infant has a car seat, crib/bassinet and other necessary items needed for infant (clothing, bottles, diapers, etc.). CSW provided mother with a mother/baby packet and briefly went over the packet with her. The packet contained information on: WIC, HANDS, PPD, counseling/support groups, NICU information, etc. CSW asked mother if she had any other questions or concerns that CSW could assist her with and she politely declined. CSW relayed to mother to inform a member of her  care team should she have any additional questions or concerns that CSW can assist her with during her and infant’s hospital admission and mother verbalized understanding. Throughout the consult mother was very pleasant, polite, cooperative and appropriate with CSW. CSW will remain available as needed throughout mother and infant’s hospital admission. HARVINDER Narvaez              Continued Care and Services - Admitted Since 5/20/2022    Coordination has not been started for this encounter.          Demographic Summary     Row Name 06/06/22 1140       General Information    Admission Type inpatient    Arrived From home    Reason for Consult psychosocial concerns;community resources;emotional/coping/adjustment concerns;other (see comments)  NICU               Functional Status     Row Name 06/06/22 1140       Mental Status    General Appearance WDL WDL               Psychosocial     Row Name 06/06/22 1140       Behavior WDL    Behavior WDL WDL       Emotion Mood WDL    Emotion/Mood/Affect WDL WDL       Speech WDL    Speech WDL WDL       Perceptual State WDL    Perceptual State WDL WDL       Thought Process WDL    Thought Process WDL WDL       Intellectual Performance WDL    Intellectual Performance WDL WDL       Coping/Stress    Major Change/Loss/Stressor birth               Abuse/Neglect     Row Name 06/06/22 1141       Personal Safety    Physical Signs of Abuse Present no               Legal    No documentation.                Substance Abuse    No documentation.                Patient Forms    No documentation.                   APOLINAR Bright

## 2022-06-06 NOTE — LACTATION NOTE
Patient is teary today. Baby Juan is in NICU and patient is pumping . She is taking syringes of colostrum to NICU.. Will call LC as needed.

## 2022-06-07 RX ORDER — ECHINACEA PURPUREA EXTRACT 125 MG
2 TABLET ORAL AS NEEDED
Status: DISCONTINUED | OUTPATIENT
Start: 2022-06-07 | End: 2022-06-08 | Stop reason: HOSPADM

## 2022-06-07 RX ORDER — ACETAMINOPHEN, ASPIRIN AND CAFFEINE 250; 250; 65 MG/1; MG/1; MG/1
2 TABLET, FILM COATED ORAL EVERY 6 HOURS PRN
Status: DISCONTINUED | OUTPATIENT
Start: 2022-06-07 | End: 2022-06-08 | Stop reason: HOSPADM

## 2022-06-07 RX ADMIN — OXYCODONE AND ACETAMINOPHEN 1 TABLET: 5; 325 TABLET ORAL at 00:31

## 2022-06-07 RX ADMIN — CETIRIZINE HYDROCHLORIDE 10 MG: 10 TABLET ORAL at 11:02

## 2022-06-07 RX ADMIN — OXYCODONE AND ACETAMINOPHEN 1 TABLET: 5; 325 TABLET ORAL at 18:06

## 2022-06-07 RX ADMIN — OXYCODONE AND ACETAMINOPHEN 1 TABLET: 5; 325 TABLET ORAL at 04:43

## 2022-06-07 RX ADMIN — LEVOTHYROXINE SODIUM 88 MCG: 0.09 TABLET ORAL at 20:14

## 2022-06-07 RX ADMIN — OXYCODONE AND ACETAMINOPHEN 1 TABLET: 5; 325 TABLET ORAL at 09:15

## 2022-06-07 RX ADMIN — IBUPROFEN 600 MG: 600 TABLET ORAL at 12:26

## 2022-06-07 RX ADMIN — DOCUSATE SODIUM 100 MG: 100 CAPSULE, LIQUID FILLED ORAL at 20:14

## 2022-06-07 RX ADMIN — IBUPROFEN 600 MG: 600 TABLET ORAL at 04:10

## 2022-06-07 RX ADMIN — LEVOTHYROXINE SODIUM 88 MCG: 0.09 TABLET ORAL at 00:32

## 2022-06-07 RX ADMIN — DOCUSATE SODIUM 100 MG: 100 CAPSULE, LIQUID FILLED ORAL at 09:15

## 2022-06-07 RX ADMIN — SERTRALINE 100 MG: 100 TABLET, FILM COATED ORAL at 09:19

## 2022-06-07 RX ADMIN — OXYCODONE AND ACETAMINOPHEN 1 TABLET: 5; 325 TABLET ORAL at 13:10

## 2022-06-07 RX ADMIN — DOCUSATE SODIUM 100 MG: 100 CAPSULE, LIQUID FILLED ORAL at 00:32

## 2022-06-07 RX ADMIN — OXYCODONE AND ACETAMINOPHEN 1 TABLET: 5; 325 TABLET ORAL at 22:32

## 2022-06-07 RX ADMIN — Medication 1 TABLET: at 09:15

## 2022-06-07 RX ADMIN — IBUPROFEN 600 MG: 600 TABLET ORAL at 20:14

## 2022-06-07 NOTE — PROGRESS NOTES
Adult Nutrition  Assessment/PES    Patient Name:  Anitra Nichols  YOB: 1985  MRN: [id number]  Admit Date:  5/20/2022    Assessment Date:  6/7/2022    Comments:  Nutrition follow up.  S/p C section 6/4 due to abruption.  Tolerating diet and passing flatus.  % at meals per chart PO data.    RD to continue to follow as needed.     Reason for Assessment     Row Name 06/07/22 1653          Reason for Assessment    Reason For Assessment follow-up protocol;per organizational policy                Nutrition/Diet History     Row Name 06/07/22 1653          Nutrition/Diet History    Typical Intake (Food/Fluid/EN/PN) % at meals                Labs/Tests/Procedures/Meds     Row Name 06/07/22 1653          Labs/Procedures/Meds    Lab Results Reviewed reviewed, pertinent     Lab Results Comments Hgb            Diagnostic Tests/Procedures    Diagnostic Test/Procedure Reviewed reviewed, pertinent     Diagnostic Test/Procedures Comments s/p C section 6/4            Medications    Pertinent Medications Reviewed reviewed, pertinent     Pertinent Medications Comments colace, pepcid, synthroid, protonix, prenatal MVI                Physical Findings     Row Name 06/07/22 1654          Physical Findings    Overall Physical Appearance B=23, abd inc, rash to L upper arm, rash to anterior groin                  Nutrition Prescription Ordered     Row Name 06/07/22 1654          Nutrition Prescription PO    Current PO Diet Regular                Evaluation of Received Nutrient/Fluid Intake     Row Name 06/07/22 1654          PO Evaluation    Number of Meals 3     % PO Intake                      Problem/Interventions:           Intervention Goal     Row Name 06/07/22 1656          Intervention Goal    General Maintain nutrition;Disease management/therapy;Meet nutritional needs for age/condition     PO Maintain intake     Weight Appropriate weight loss                Nutrition Intervention     Row Name  06/07/22 1655          Nutrition Intervention    RD/Tech Action Follow Tx progress;Care plan reviewd                  Education/Evaluation     Row Name 06/07/22 1655          Education    Education Will Instruct as appropriate            Monitor/Evaluation    Monitor Per protocol;PO intake;Pertinent labs;Weight;Skin status;Symptoms                 Electronically signed by:  Mar Petty RD  06/07/22 16:55 EDT

## 2022-06-07 NOTE — LACTATION NOTE
This note was copied from a baby's chart.  Informed PT that LC is here to help tonight. Baby is in NICU and mother is pumping. Reports she was able to pump 1.5 ml last time. Educated on the importance of the frequency of pumping for adequate milk supply.  PT declines any questions and concerns at this time. Encouraged to call LC if needing further assistance.

## 2022-06-07 NOTE — LACTATION NOTE
Mom reports she is pumping during the day every 3 hours. She reports she didn't pump last night, she was tired. Encouraged to pump every 3 hours. Mom reports getting approx. 1 1/2 ml of colostrum when pumping. She denies questions at this time. Educated on milk coming in and sterilizing pump pieces. Encouraged mom to call LC as needed    Lactation Consult Note    Evaluation Completed: 2022 08:16 EDT  Patient Name: Anitra Nichols  :  1985  MRN:  [id number]     REFERRAL  INFORMATION:                                         DELIVERY HISTORY:        Skin to skin initiation date/time:      Skin to skin end date/time:           MATERNAL ASSESSMENT:                               INFANT ASSESSMENT:  Information for the patient's :  Simone Gibson [[id number]]   No past medical history on file.                                                                                                     MATERNAL INFANT FEEDING:                                                                       EQUIPMENT TYPE:                                 BREAST PUMPING:          LACTATION REFERRALS:

## 2022-06-07 NOTE — PROGRESS NOTES
[facility]   PROGRESS NOTE    Patient Name: Anitra Nichols  :  1985  MRN:  [id number]      Post-Op 3 S/P   Subjective     Patient reports:   Doing well. Pain well controlled. Tolerating regular diet and having flatus.    C/o intermittent frontal HA.  No visual changes.    Lochia normal.       Objective       Vitals: Vital Signs Range for the last 24 hours  Temperature: Temp:  [97.6 °F (36.4 °C)-97.7 °F (36.5 °C)] 97.6 °F (36.4 °C)       BP: BP: (87-95)/(54-58) 91/54   Pulse: Heart Rate:  [66-73] 66   Respirations: Resp:  [16-18] 18                                         Physical Exam     General Alert       Abdomen Soft, non-distended, fundus firm, 1 below umbilicus, appropriately tender      Incision  Intact, no erythema or exudate      Extremities Calves NT bilaterally                Assessment & Plan  :   POD 3  -  Doing well.  Continue usual cares.   HA intermittent in frontal region.  No n/v/ vision changes.  Hasn't had much caffeine.  Will try that, saline spray, excedrin.   Unsure if she wants to go home or not.  Wants to go spend some time w/ baby and decide.        Pregnancy    Pregnancy resulting from in vitro fertilization in third trimester     premature rupture of membranes (PPROM) with onset of labor after 24 hours of rupture in third trimester, antepartum    AMA (advanced maternal age) multigravida 35+               Samantha Navarrete, MARIAELENA  2022  09:42 EDT

## 2022-06-07 NOTE — PLAN OF CARE
Problem: Adult Inpatient Plan of Care  Goal: Plan of Care Review  Outcome: Ongoing, Progressing  Flowsheets (Taken 6/7/2022 0406)  Progress: improving  Plan of Care Reviewed With: patient  Outcome Evaluation: VSS, fundus and lochia WDL, incision WDL, up ad dandy, voiding without difficulty, pumping and visits infant in the NICU, pain control with PO meds  Goal: Patient-Specific Goal (Individualized)  Outcome: Ongoing, Progressing  Goal: Absence of Hospital-Acquired Illness or Injury  Outcome: Ongoing, Progressing  Intervention: Identify and Manage Fall Risk  Recent Flowsheet Documentation  Taken 6/7/2022 0255 by Annika Power RN  Safety Promotion/Fall Prevention: safety round/check completed  Taken 6/7/2022 0031 by Annika Power RN  Safety Promotion/Fall Prevention: safety round/check completed  Taken 6/6/2022 2355 by Annika Power RN  Safety Promotion/Fall Prevention: safety round/check completed  Taken 6/6/2022 2215 by Annika Power RN  Safety Promotion/Fall Prevention: safety round/check completed  Taken 6/6/2022 2130 by Annika Power RN  Safety Promotion/Fall Prevention: safety round/check completed  Taken 6/6/2022 2027 by Annika Power RN  Safety Promotion/Fall Prevention: safety round/check completed  Taken 6/6/2022 1957 by Annika Power RN  Safety Promotion/Fall Prevention: safety round/check completed  Intervention: Prevent Skin Injury  Recent Flowsheet Documentation  Taken 6/6/2022 1957 by Annika Power RN  Body Position:   position changed independently   sitting up in bed  Intervention: Prevent and Manage VTE (Venous Thromboembolism) Risk  Recent Flowsheet Documentation  Taken 6/6/2022 1957 by Annika Power RN  Activity Management: up ad dandy  Goal: Optimal Comfort and Wellbeing  Outcome: Ongoing, Progressing  Intervention: Monitor Pain and Promote Comfort  Recent Flowsheet Documentation  Taken 6/6/2022 2027 by Annika Power RN  Pain Management Interventions:   see MAR   quiet  environment facilitated   care clustered   cold applied  Taken 2022 by Annika Power RN  Pain Management Interventions: see MAR  Intervention: Provide Person-Centered Care  Recent Flowsheet Documentation  Taken 2022 by Annika Power RN  Trust Relationship/Rapport:   care explained   choices provided   questions answered   questions encouraged  Goal: Readiness for Transition of Care  Outcome: Ongoing, Progressing     Problem: Skin Injury Risk Increased  Goal: Skin Health and Integrity  Outcome: Ongoing, Progressing  Intervention: Optimize Skin Protection  Recent Flowsheet Documentation  Taken 2022 by Annika Power RN  Head of Bed (HOB) Positioning: HOB elevated     Problem:  Labor  Goal: Delayed  Delivery  Outcome: Ongoing, Progressing  Intervention: Monitor and Manage  Labor  Recent Flowsheet Documentation  Taken 2022 by Annika Power RN  Body Position:   position changed independently   sitting up in bed  Intervention: Manage Tocolytic Therapy Administration  Recent Flowsheet Documentation  Taken 2022 by Annika Power RN  Medication Review/Management: medications reviewed     Problem:  Fall Injury Risk  Goal: Absence of Fall, Infant Drop and Related Injury  Outcome: Ongoing, Progressing  Intervention: Identify and Manage Contributors  Recent Flowsheet Documentation  Taken 2022 by Annika Power RN  Medication Review/Management: medications reviewed  Intervention: Promote Injury-Free Environment  Recent Flowsheet Documentation  Taken 2022 0255 by Annika Power RN  Safety Promotion/Fall Prevention: safety round/check completed  Taken 2022 0031 by Annika Power RN  Safety Promotion/Fall Prevention: safety round/check completed  Taken 2022 2355 by Annika Power RN  Safety Promotion/Fall Prevention: safety round/check completed  Taken 2022 2215 by Annika Power RN  Safety Promotion/Fall  Prevention: safety round/check completed  Taken 6/6/2022 2130 by Annika Power, RN  Safety Promotion/Fall Prevention: safety round/check completed  Taken 6/6/2022 2027 by Annika Power RN  Safety Promotion/Fall Prevention: safety round/check completed  Taken 6/6/2022 1957 by Annika Power RN  Safety Promotion/Fall Prevention: safety round/check completed   Goal Outcome Evaluation:  Plan of Care Reviewed With: patient        Progress: improving  Outcome Evaluation: VSS, fundus and lochia WDL, incision WDL, up ad dandy, voiding without difficulty, pumping and visits infant in the NICU, pain control with PO meds

## 2022-06-08 VITALS
RESPIRATION RATE: 18 BRPM | DIASTOLIC BLOOD PRESSURE: 71 MMHG | SYSTOLIC BLOOD PRESSURE: 105 MMHG | HEART RATE: 71 BPM | HEIGHT: 67 IN | OXYGEN SATURATION: 100 % | WEIGHT: 212.8 LBS | BODY MASS INDEX: 33.4 KG/M2 | TEMPERATURE: 97.9 F

## 2022-06-08 RX ORDER — OXYCODONE HYDROCHLORIDE AND ACETAMINOPHEN 5; 325 MG/1; MG/1
1 TABLET ORAL EVERY 4 HOURS PRN
Qty: 18 TABLET | Refills: 0 | Status: SHIPPED | OUTPATIENT
Start: 2022-06-08

## 2022-06-08 RX ORDER — IBUPROFEN 600 MG/1
600 TABLET ORAL EVERY 6 HOURS PRN
Qty: 60 TABLET | Refills: 0 | Status: SHIPPED | OUTPATIENT
Start: 2022-06-08

## 2022-06-08 RX ORDER — OXYCODONE HYDROCHLORIDE AND ACETAMINOPHEN 5; 325 MG/1; MG/1
1 TABLET ORAL EVERY 4 HOURS PRN
Qty: 18 TABLET | Refills: 0 | Status: SHIPPED | OUTPATIENT
Start: 2022-06-08 | End: 2022-06-08 | Stop reason: SDUPTHER

## 2022-06-08 RX ADMIN — IBUPROFEN 600 MG: 600 TABLET ORAL at 04:37

## 2022-06-08 RX ADMIN — OXYCODONE AND ACETAMINOPHEN 1 TABLET: 5; 325 TABLET ORAL at 02:44

## 2022-06-08 RX ADMIN — Medication 1 TABLET: at 08:08

## 2022-06-08 RX ADMIN — CETIRIZINE HYDROCHLORIDE 10 MG: 10 TABLET ORAL at 08:08

## 2022-06-08 RX ADMIN — SERTRALINE 100 MG: 100 TABLET, FILM COATED ORAL at 08:08

## 2022-06-08 RX ADMIN — OXYCODONE AND ACETAMINOPHEN 1 TABLET: 5; 325 TABLET ORAL at 08:12

## 2022-06-08 RX ADMIN — DOCUSATE SODIUM 100 MG: 100 CAPSULE, LIQUID FILLED ORAL at 08:08

## 2022-06-08 NOTE — PLAN OF CARE
Problem: Adult Inpatient Plan of Care  Goal: Plan of Care Review  Outcome: Ongoing, Progressing  Flowsheets (Taken 6/8/2022 0250)  Progress: improving  Plan of Care Reviewed With: patient  Outcome Evaluation: VSS, fundus and lochia WDL, incision WDL, up ad dandy, voiding without difficulty, pumping, and visiting infant in the NICU, pain control with PO meds, DC today  Goal: Patient-Specific Goal (Individualized)  Outcome: Ongoing, Progressing  Goal: Absence of Hospital-Acquired Illness or Injury  Outcome: Ongoing, Progressing  Intervention: Identify and Manage Fall Risk  Recent Flowsheet Documentation  Taken 6/8/2022 0244 by Annika Power RN  Safety Promotion/Fall Prevention: safety round/check completed  Taken 6/8/2022 0010 by Annika Power RN  Safety Promotion/Fall Prevention: safety round/check completed  Taken 6/7/2022 2330 by Annika Power RN  Safety Promotion/Fall Prevention: safety round/check completed  Taken 6/7/2022 2232 by Annika Power RN  Safety Promotion/Fall Prevention: safety round/check completed  Taken 6/7/2022 2110 by Annika Power RN  Safety Promotion/Fall Prevention: safety round/check completed  Taken 6/7/2022 2014 by Annika Power RN  Safety Promotion/Fall Prevention: safety round/check completed  Intervention: Prevent Skin Injury  Recent Flowsheet Documentation  Taken 6/7/2022 2014 by Annika Power RN  Body Position: position changed independently  Intervention: Prevent and Manage VTE (Venous Thromboembolism) Risk  Recent Flowsheet Documentation  Taken 6/7/2022 2110 by Annika Power RN  Activity Management: (ambulating back to room) other (see comments)  Taken 6/7/2022 2014 by Annika Power RN  Activity Management: up ad dandy  Goal: Optimal Comfort and Wellbeing  Outcome: Ongoing, Progressing  Intervention: Monitor Pain and Promote Comfort  Recent Flowsheet Documentation  Taken 6/8/2022 0244 by Annika Power RN  Pain Management Interventions: see  MAR  Intervention: Provide Person-Centered Care  Recent Flowsheet Documentation  Taken 2022 by Annika Power RN  Trust Relationship/Rapport:   care explained   choices provided   questions answered   questions encouraged  Goal: Readiness for Transition of Care  Outcome: Ongoing, Progressing     Problem: Skin Injury Risk Increased  Goal: Skin Health and Integrity  Outcome: Ongoing, Progressing  Intervention: Optimize Skin Protection  Recent Flowsheet Documentation  Taken 2022 by Annika Power RN  Head of Bed (HOB) Positioning: HOB elevated     Problem:  Labor  Goal: Delayed  Delivery  Outcome: Ongoing, Progressing  Intervention: Monitor and Manage  Labor  Recent Flowsheet Documentation  Taken 2022 by Annika Power RN  Body Position: position changed independently  Intervention: Manage Tocolytic Therapy Administration  Recent Flowsheet Documentation  Taken 2022 by Annika Power RN  Medication Review/Management: medications reviewed     Problem:  Fall Injury Risk  Goal: Absence of Fall, Infant Drop and Related Injury  Outcome: Ongoing, Progressing  Intervention: Identify and Manage Contributors  Recent Flowsheet Documentation  Taken 2022 by Annika Power RN  Medication Review/Management: medications reviewed  Intervention: Promote Injury-Free Environment  Recent Flowsheet Documentation  Taken 2022 0244 by Annika Power RN  Safety Promotion/Fall Prevention: safety round/check completed  Taken 2022 0010 by Annika Power RN  Safety Promotion/Fall Prevention: safety round/check completed  Taken 2022 2330 by Annika Power RN  Safety Promotion/Fall Prevention: safety round/check completed  Taken 2022 2232 by Annika Power RN  Safety Promotion/Fall Prevention: safety round/check completed  Taken 2022 2110 by Annika Power RN  Safety Promotion/Fall Prevention: safety round/check completed  Taken 2022  2014 by Annika Power, RN  Safety Promotion/Fall Prevention: safety round/check completed   Goal Outcome Evaluation:  Plan of Care Reviewed With: patient        Progress: improving  Outcome Evaluation: VSS, fundus and lochia WDL, incision WDL, up ad dandy, voiding without difficulty, pumping, and visiting infant in the NICU, pain control with PO meds, DC today

## 2022-06-08 NOTE — PLAN OF CARE
Problem: Adult Inpatient Plan of Care  Goal: Plan of Care Review  Outcome: Met  Flowsheets (Taken 6/8/2022 0925)  Progress: improving  Plan of Care Reviewed With: patient  Goal: Patient-Specific Goal (Individualized)  Outcome: Met  Goal: Absence of Hospital-Acquired Illness or Injury  Outcome: Met  Intervention: Identify and Manage Fall Risk  Recent Flowsheet Documentation  Taken 6/8/2022 0812 by Judith Holland RN  Safety Promotion/Fall Prevention: safety round/check completed  Intervention: Prevent Skin Injury  Recent Flowsheet Documentation  Taken 6/8/2022 0812 by Judith Holland RN  Body Position: sitting up in bed  Intervention: Prevent and Manage VTE (Venous Thromboembolism) Risk  Recent Flowsheet Documentation  Taken 6/8/2022 0812 by Judith Holland RN  Activity Management:   up ad dandy   ambulated to bathroom  Goal: Optimal Comfort and Wellbeing  Outcome: Met  Intervention: Monitor Pain and Promote Comfort  Recent Flowsheet Documentation  Taken 6/8/2022 0812 by Judith Holland RN  Pain Management Interventions: see MAR  Intervention: Provide Person-Centered Care  Recent Flowsheet Documentation  Taken 6/8/2022 0812 by Judith Holland RN  Trust Relationship/Rapport:   choices provided   care explained   questions answered   questions encouraged  Goal: Readiness for Transition of Care  Outcome: Met  Intervention: Mutually Develop Transition Plan  Recent Flowsheet Documentation  Taken 6/8/2022 0843 by Judith Holland RN  Equipment Needed After Discharge: none  Equipment Currently Used at Home: none  Anticipated Changes Related to Illness: none  Transportation Anticipated: family or friend will provide  Concerns to be Addressed: no discharge needs identified  Patient/Family Anticipated Services at Transition: none  Patient/Family Anticipates Transition to:   home   home with family   Goal Outcome Evaluation:  Plan of Care Reviewed With: patient        Progress: improving

## 2022-06-08 NOTE — DISCHARGE SUMMARY
Date of Discharge:  2022    Discharge Diagnosis: primary c/s    Presenting Problem/History of Present Illness  Pregnancy [Z34.90]       Hospital Course  Patient is a 37 y.o. female presented with PPROM at 29wk.  Was kept in house until had bldg/ abruption-- underwent primary c/s w/ classical uterine incision.  Delivered viable male infant per Dr. Brown.  Baby is doing great in NICU.  She feels well and is ready for d/c home today.      Procedures Performed  Procedure(s):   SECTION PRIMARY         Condition on Discharge:  Post-Op Day 4 S/P   Subjective     Patient reports:    Doing well - ready for discharge. Pain well controlled. Tolerating po and   having flatus. Voiding and ambulating without difficulty. Lochia normal.     Vital Signs  Temp:  [97.6 °F (36.4 °C)-97.9 °F (36.6 °C)] 97.9 °F (36.6 °C)  Heart Rate:  [71-76] 71  Resp:  [18] 18  BP: ()/(54-71) 105/71    Physical Exam    Gen Alert and awake   Abdomen Soft, ND,  Fundus firm with minimal tenderness   Incision  Intact, without erythema or exudate   Extremities Calves NT bilaterally     Assessment & Plan ]   Assessment:  POD 4  -  Doing well. Stable for discharge.     Plan:    Instructions reviewed       Consults:   Consults     Date and Time Order Name Status Description    2022 10:05 PM Inpatient Consult to Neonatology Completed           Discharge Disposition  Home or Self Care    Discharge Medications     Discharge Medications      Changes to Medications      Instructions Start Date   ibuprofen 600 MG tablet  Commonly known as: ADVIL,MOTRIN  What changed:   · medication strength  · how much to take   600 mg, Oral, Every 6 Hours PRN         Continue These Medications      Instructions Start Date   cetirizine 10 MG tablet  Commonly known as: zyrTEC   10 mg, Oral, Daily      levothyroxine 75 MCG tablet  Commonly known as: SYNTHROID, LEVOTHROID   75 mcg, Oral, Daily      oxyCODONE-acetaminophen 5-325 MG per  tablet  Commonly known as: PERCOCET   1 tablet, Oral, Every 4 Hours PRN      prenatal (CLASSIC) vitamin  tablet  Generic drug: prenatal vitamin   1 tablet, Oral, Daily      sertraline 100 MG tablet  Commonly known as: ZOLOFT   100 mg, Oral, Daily         Stop These Medications    esomeprazole 20 MG capsule  Commonly known as: nexIUM     ondansetron ODT 4 MG disintegrating tablet  Commonly known as: ZOFRAN-ODT     phenazopyridine 200 MG tablet  Commonly known as: Pyridium            The patient has been prescribed a controlled substance.  She has been counseled on the risks associated with using the medication.   The addictive potential of this medication and alternatives were discussed carefully with this patient and she demonstrated understanding.  A JEFF report has been obtained and reviewed.    Activity at Discharge: restrictions reviewed    Follow-up Appointments  No future appointments.      Test Results Pending at Discharge       MARIAELENA Lara  06/08/22  09:17 EDT

## 2022-06-10 NOTE — PAYOR COMM NOTE
[facility]    &  [facility] Gayle Delacruz  [address]    [address] [place] [address]    [place] [address]    Ann Donovan - [phone]  Utilization Review/Room Reservations  Phone: [phone], [phone], [phone] or [phone]  Fax: [phone]  Email: [email or web address]  Please call, fax back, or email with authorization or any questions! Thanks!    D/C SUMMARY  AUTH#[id number];          This fax contains any of the following:  Face Sheet, H&P, progress notes, consults, orders, meds, lab results, labor record, vitals, delivery worksheet, op note, d/c summary.  The information contained in this fax is confidential for the use of the Individual or entity named above. If the reader of this message is not the Intended recipient (or the employee or agent responsible to deliver it to the Intended recipient), you are hereby notified that any dissemination, distribution, or copy of this communication is prohibited. If you have received this communication in error, please notify us by collect telephone call and return the original message to us at the above address at our expense.  Anitra Nichols (37 y.o. Female)             Date of Birth   1985    Social Security Number       Address   [address] [place] [address]    Home Phone   [phone]    N   [id number]       Yarsani   Sabianism    Marital Status                               Admission Date   5/20/22    Admission Type   Emergency    Admitting Provider   Hodan More MD    Attending Provider       Department, Room/Bed   [facility] 3 EAST, E365/1       Discharge Date   6/8/2022    Discharge Disposition   Home or Self Care    Discharge Destination                               Attending Provider: (none)   Allergies: Sulfa Antibiotics    Isolation: None   Infection: None   Code Status: Prior   Advance Care Planning Activity     "Ht: 170.2 cm (67\")   Wt: 96.5 kg (212 lb 12.8 oz)    Admission Cmt: None   Principal Problem: None                Active Insurance as of 2022     Primary Coverage     Payor Plan Insurance Group Employer/Plan Group    [facility] [id number]     Payor Plan Address Payor Plan Phone Number Payor Plan Fax Number Effective Dates    [address]   2022 - None Entered    [facility] [id number]       Subscriber Name Subscriber Birth Date Member ID       [place] 1985 [id number]                 Emergency Contacts      (Rel.) Home Phone Work Phone Mobile Phone    [place] (Spouse) [phone] -- --               Discharge Summary      Samantha Navarrete APRN at 22 0910     Attestation signed by Jane Gaitan MD at 22 1801    I have reviewed this documentation and agree.                    Date of Discharge:  2022    Discharge Diagnosis: primary c/s    Presenting Problem/History of Present Illness  Pregnancy [Z34.90]       Hospital Course  Patient is a 37 y.o. female presented with PPROM at 29wk.  Was kept in house until had bldg/ abruption-- underwent primary c/s w/ classical uterine incision.  Delivered viable male infant per Dr. Brown.  Baby is doing great in NICU.  She feels well and is ready for d/c home today.      Procedures Performed  Procedure(s):   SECTION PRIMARY         Condition on Discharge:  Post-Op Day 4 S/P   Subjective     Patient reports:    Doing well - ready for discharge. Pain well controlled. Tolerating po and   having flatus. Voiding and ambulating without difficulty. Lochia normal.     Vital Signs  Temp:  [97.6 °F (36.4 °C)-97.9 °F (36.6 °C)] 97.9 °F (36.6 °C)  Heart Rate:  [71-76] 71  Resp:  [18] 18  BP: ()/(54-71) 105/71    Physical Exam    Gen Alert and awake   Abdomen Soft, ND,  Fundus firm with minimal tenderness   Incision  Intact, without erythema or exudate   Extremities Calves NT " bilaterally     Assessment & Plan ]   Assessment:  POD 4  -  Doing well. Stable for discharge.     Plan:    Instructions reviewed       Consults:   Consults     Date and Time Order Name Status Description    5/20/2022 10:05 PM Inpatient Consult to Neonatology Completed           Discharge Disposition  Home or Self Care    Discharge Medications     Discharge Medications      Changes to Medications      Instructions Start Date   ibuprofen 600 MG tablet  Commonly known as: ADVILMOTRIN  What changed:   · medication strength  · how much to take   600 mg, Oral, Every 6 Hours PRN         Continue These Medications      Instructions Start Date   cetirizine 10 MG tablet  Commonly known as: zyrTEC   10 mg, Oral, Daily      levothyroxine 75 MCG tablet  Commonly known as: SYNTHROID, LEVOTHROID   75 mcg, Oral, Daily      oxyCODONE-acetaminophen 5-325 MG per tablet  Commonly known as: PERCOCET   1 tablet, Oral, Every 4 Hours PRN      prenatal (CLASSIC) vitamin  tablet  Generic drug: prenatal vitamin   1 tablet, Oral, Daily      sertraline 100 MG tablet  Commonly known as: ZOLOFT   100 mg, Oral, Daily         Stop These Medications    esomeprazole 20 MG capsule  Commonly known as: nexIUM     ondansetron ODT 4 MG disintegrating tablet  Commonly known as: ZOFRAN-ODT     phenazopyridine 200 MG tablet  Commonly known as: Pyridium            The patient has been prescribed a controlled substance.  She has been counseled on the risks associated with using the medication.   The addictive potential of this medication and alternatives were discussed carefully with this patient and she demonstrated understanding.  A JEFF report has been obtained and reviewed.    Activity at Discharge: restrictions reviewed    Follow-up Appointments  No future appointments.      Test Results Pending at Discharge       MARIAELENA Lara  06/08/22  09:17 EDT              Electronically signed by Jane Gaitan MD at 06/08/22 2190

## 2023-02-08 ENCOUNTER — HOSPITAL ENCOUNTER (OUTPATIENT)
Dept: LACTATION | Facility: HOSPITAL | Age: 38
Discharge: HOME | End: 2023-02-08

## 2023-02-08 NOTE — LACTATION NOTE
Lactation Consult Note  Mom is here today due to painful latch. Baby boy is 8 month old and has been latching great until a week ago. No health or gaining issues. BF session started per mom. She positioned baby on the right breast in cross cradle position. Baby is very eager and started latching immediately  on his own, without moms help. He is latching only on the nipple and mom said is hurting. Baby detached and LC assisted mother in latching him again. Educated her starting nose to nipple to obtain deep latch and baby was able to achieve it immediately. He is latching well, has nutritive suckle, and has a good jaw rotation and mom reports that latch doesn't hurt at all. Baby is at the age when he is getting easily distracted and often pops off and has to be re latched. Mom's able to latch him deeply again easily after the education.  PT also mentioned she has been having trouble latching him on the left breast , because her nipple is shorter. After BF for 15 min on the right breast infant transferred on the left one and mom was able to latch him quickly .She is very happy he is latching on both breast and is not painful. For total of 22 min. of BF baby transferred 2.5 oz of breast milk.       Evaluation Completed: 2023 17:25 EST  Patient Name: Anitra Nichols  :  1985  MRN:  [id number]     REFERRAL  INFORMATION:                          Date of Referral: 23   Person Making Referral: patient  Maternal Reason for Referral: breast/nipple pain  Infant Reason for Referral: one breast preferred      MATERNAL ASSESSMENT:  Breast Size Issue: none (23 1200)  Breast Shape: pendulous, angled (23 1200)  Breast Density: Bilateral:, full (23 1200)  Areola: Bilateral:, elastic (23 1200)  Nipples: Bilateral:, everted, graspable (23 1200)     Left Nipple Symptoms: intact, nontender (23 1200)  Right Nipple Symptoms: intact, tender (23 1200)       MATERNAL INFANT  FEEDING:     Maternal Emotional State: receptive, relaxed (23)  Infant Positioning: cross-cradle, cradle (23)   Signs of Milk Transfer: audible swallow (23)  Pain with Feeding: no (23)           Milk Ejection Reflex: present (23)           Latch Assistance: minimal assistance (23)                           Feeding Readiness Cues: eager, rooting (23)  Satiety Cues: calm after feeding (23)     Effective Latch During Feeding: yes (23)  Suck/Swallow/Breathing Coordination: present (23)     Prefeeding Weight (gm): 7260 g (256.1 oz) (23)  Postfeeding Weight (gm): 7335 g (258.7 oz) (23)  Weight Gain/Loss (gm) : 75 g (2.7 oz) (23)      Latch: 2-->grasps breast, tongue down, lips flanged, rhythmic sucking (23)  Audible Swallowin-->spontaneous and intermittent (24 hrs old) (23)  Type of Nipple: 2-->everted (after stimulation) (23)  Comfort (Breast/Nipple): 2-->soft/nontender (23)  Hold (Positioning): 1-->minimal assist, teach one side, mother does other, staff holds (23)  Latch Score: 9 (23)      EQUIPMENT TYPE:                                 BREAST PUMPING:          LACTATION REFERRALS:

## (undated) DEVICE — SUT GUT CHRM 0 CT 27IN 914H

## (undated) DEVICE — SOL IRR H2O BTL 1000ML STRL

## (undated) DEVICE — SUT GUT CHRM 3/0 CT1 3/0 36IN 922H

## (undated) DEVICE — ADHS SKIN DERMABOND TOPICAL HI VISC

## (undated) DEVICE — SUT VICRYL 2-0  X-1 27IN ETVCP459H PLS

## (undated) DEVICE — CATH URETRL FLXITP POLLACK STD 5F 70CM

## (undated) DEVICE — SUT MNCRYL PLS ANTIB UD 4/0 PS2 18IN

## (undated) DEVICE — NITINOL STONE RETRIEVAL BASKET: Brand: ZERO TIP

## (undated) DEVICE — KENDALL SCD EXPRESS SLEEVES, KNEE LENGTH, MEDIUM: Brand: KENDALL SCD

## (undated) DEVICE — SUT MONOCRYL PLS 3/0 CT1 UD/MF 90CM MCP944H

## (undated) DEVICE — NDL BLNT 18G 1 1/2IN

## (undated) DEVICE — TRANSPOSAL ULTRAFLEX DUO/QUAD ULTRA CART MANIFOLD

## (undated) DEVICE — NITINOL WIRE WITH HYDROPHILIC TIP: Brand: SENSOR

## (undated) DEVICE — FIB LASR MH SGL 400 DISP

## (undated) DEVICE — SUT PDS 0 CTX 36IN DYED Z370T

## (undated) DEVICE — KT ART BLD GAS QUICK DRAW

## (undated) DEVICE — GLV SURG BIOGEL LTX PF 6 1/2

## (undated) DEVICE — APPL HEMO ENDO SURGICEL 2IN1 1P/U STRL

## (undated) DEVICE — 3M(TM) TEGADERM(TM) TRANSPARENT FILM DRESSING FRAME STYLE 1627: Brand: 3M™ TEGADERM™

## (undated) DEVICE — GLV SURG SENSICARE W/ALOE PF LF 7 STRL

## (undated) DEVICE — PK CYSTO 90

## (undated) DEVICE — SUT MNCRYL 0/0 CTX 36IN Y398H

## (undated) DEVICE — SYS IRR PUMP SGL ACTN VAC SYR 10CC